# Patient Record
Sex: MALE | Race: WHITE | Employment: OTHER | URBAN - METROPOLITAN AREA
[De-identification: names, ages, dates, MRNs, and addresses within clinical notes are randomized per-mention and may not be internally consistent; named-entity substitution may affect disease eponyms.]

---

## 2019-10-07 ENCOUNTER — HOSPITAL ENCOUNTER (INPATIENT)
Facility: CLINIC | Age: 84
LOS: 2 days | Discharge: HOME | DRG: 062 | End: 2019-10-09
Attending: EMERGENCY MEDICINE | Admitting: INTERNAL MEDICINE
Payer: COMMERCIAL

## 2019-10-07 ENCOUNTER — APPOINTMENT (OUTPATIENT)
Dept: CT IMAGING | Facility: CLINIC | Age: 84
DRG: 062 | End: 2019-10-07
Attending: EMERGENCY MEDICINE
Payer: COMMERCIAL

## 2019-10-07 DIAGNOSIS — I48.91 ATRIAL FIBRILLATION, NEW ONSET (H): ICD-10-CM

## 2019-10-07 DIAGNOSIS — I63.9 CEREBROVASCULAR ACCIDENT (CVA), UNSPECIFIED MECHANISM (H): ICD-10-CM

## 2019-10-07 DIAGNOSIS — R93.89 ABNORMAL VENTRICULAR WALL MOTION: Primary | ICD-10-CM

## 2019-10-07 LAB
ANION GAP SERPL CALCULATED.3IONS-SCNC: 5 MMOL/L (ref 3–14)
APTT PPP: 24 SEC (ref 22–37)
BASOPHILS # BLD AUTO: 0 10E9/L (ref 0–0.2)
BASOPHILS NFR BLD AUTO: 0.2 %
BUN SERPL-MCNC: 30 MG/DL (ref 7–30)
CALCIUM SERPL-MCNC: 9.3 MG/DL (ref 8.5–10.1)
CHLORIDE SERPL-SCNC: 103 MMOL/L (ref 94–109)
CO2 SERPL-SCNC: 31 MMOL/L (ref 20–32)
CREAT SERPL-MCNC: 1.25 MG/DL (ref 0.66–1.25)
DIFFERENTIAL METHOD BLD: ABNORMAL
EOSINOPHIL # BLD AUTO: 0.1 10E9/L (ref 0–0.7)
EOSINOPHIL NFR BLD AUTO: 0.2 %
ERYTHROCYTE [DISTWIDTH] IN BLOOD BY AUTOMATED COUNT: 13.3 % (ref 10–15)
GFR SERPL CREATININE-BSD FRML MDRD: 52 ML/MIN/{1.73_M2}
GLUCOSE BLDC GLUCOMTR-MCNC: 86 MG/DL (ref 70–99)
GLUCOSE SERPL-MCNC: 98 MG/DL (ref 70–99)
HCT VFR BLD AUTO: 49.5 % (ref 40–53)
HGB BLD-MCNC: 16.4 G/DL (ref 13.3–17.7)
IMM GRANULOCYTES # BLD: 0.3 10E9/L (ref 0–0.4)
IMM GRANULOCYTES NFR BLD: 1.2 %
INR PPP: 1.03 (ref 0.86–1.14)
LYMPHOCYTES # BLD AUTO: 1.7 10E9/L (ref 0.8–5.3)
LYMPHOCYTES NFR BLD AUTO: 8.1 %
MCH RBC QN AUTO: 31.5 PG (ref 26.5–33)
MCHC RBC AUTO-ENTMCNC: 33.1 G/DL (ref 31.5–36.5)
MCV RBC AUTO: 95 FL (ref 78–100)
MONOCYTES # BLD AUTO: 1.7 10E9/L (ref 0–1.3)
MONOCYTES NFR BLD AUTO: 8.1 %
NEUTROPHILS # BLD AUTO: 17.6 10E9/L (ref 1.6–8.3)
NEUTROPHILS NFR BLD AUTO: 82.2 %
PLATELET # BLD AUTO: 270 10E9/L (ref 150–450)
POTASSIUM SERPL-SCNC: 4.1 MMOL/L (ref 3.4–5.3)
RBC # BLD AUTO: 5.2 10E12/L (ref 4.4–5.9)
SODIUM SERPL-SCNC: 139 MMOL/L (ref 133–144)
TROPONIN I SERPL-MCNC: 0.05 UG/L (ref 0–0.04)
WBC # BLD AUTO: 21.4 10E9/L (ref 4–11)

## 2019-10-07 PROCEDURE — 99291 CRITICAL CARE FIRST HOUR: CPT | Mod: GC | Performed by: PSYCHIATRY & NEUROLOGY

## 2019-10-07 PROCEDURE — 70498 CT ANGIOGRAPHY NECK: CPT

## 2019-10-07 PROCEDURE — 25000132 ZZH RX MED GY IP 250 OP 250 PS 637: Performed by: INTERNAL MEDICINE

## 2019-10-07 PROCEDURE — 99292 CRITICAL CARE ADDL 30 MIN: CPT

## 2019-10-07 PROCEDURE — 0042T CT HEAD PERFUSION WITH CONTRAST: CPT

## 2019-10-07 PROCEDURE — 80048 BASIC METABOLIC PNL TOTAL CA: CPT | Performed by: EMERGENCY MEDICINE

## 2019-10-07 PROCEDURE — 25000125 ZZHC RX 250: Performed by: EMERGENCY MEDICINE

## 2019-10-07 PROCEDURE — 85025 COMPLETE CBC W/AUTO DIFF WBC: CPT | Performed by: EMERGENCY MEDICINE

## 2019-10-07 PROCEDURE — 3E03317 INTRODUCTION OF OTHER THROMBOLYTIC INTO PERIPHERAL VEIN, PERCUTANEOUS APPROACH: ICD-10-PCS | Performed by: EMERGENCY MEDICINE

## 2019-10-07 PROCEDURE — 99222 1ST HOSP IP/OBS MODERATE 55: CPT | Mod: AI | Performed by: INTERNAL MEDICINE

## 2019-10-07 PROCEDURE — 93005 ELECTROCARDIOGRAM TRACING: CPT

## 2019-10-07 PROCEDURE — 00000146 ZZHCL STATISTIC GLUCOSE BY METER IP

## 2019-10-07 PROCEDURE — 99291 CRITICAL CARE FIRST HOUR: CPT

## 2019-10-07 PROCEDURE — 70450 CT HEAD/BRAIN W/O DYE: CPT

## 2019-10-07 PROCEDURE — 85610 PROTHROMBIN TIME: CPT | Performed by: EMERGENCY MEDICINE

## 2019-10-07 PROCEDURE — 85730 THROMBOPLASTIN TIME PARTIAL: CPT | Performed by: EMERGENCY MEDICINE

## 2019-10-07 PROCEDURE — 96374 THER/PROPH/DIAG INJ IV PUSH: CPT

## 2019-10-07 PROCEDURE — 36415 COLL VENOUS BLD VENIPUNCTURE: CPT | Performed by: INTERNAL MEDICINE

## 2019-10-07 PROCEDURE — 99207 ZZC DOWN CODE DUE TO INITIAL EXAM: CPT | Performed by: INTERNAL MEDICINE

## 2019-10-07 PROCEDURE — 20000003 ZZH R&B ICU

## 2019-10-07 PROCEDURE — 25000128 H RX IP 250 OP 636: Performed by: EMERGENCY MEDICINE

## 2019-10-07 PROCEDURE — 25000128 H RX IP 250 OP 636: Performed by: STUDENT IN AN ORGANIZED HEALTH CARE EDUCATION/TRAINING PROGRAM

## 2019-10-07 PROCEDURE — 84484 ASSAY OF TROPONIN QUANT: CPT | Performed by: INTERNAL MEDICINE

## 2019-10-07 RX ORDER — BISACODYL 10 MG
10 SUPPOSITORY, RECTAL RECTAL DAILY PRN
Status: DISCONTINUED | OUTPATIENT
Start: 2019-10-07 | End: 2019-10-09 | Stop reason: HOSPADM

## 2019-10-07 RX ORDER — LABETALOL HYDROCHLORIDE 5 MG/ML
10 INJECTION, SOLUTION INTRAVENOUS ONCE
Status: DISCONTINUED | OUTPATIENT
Start: 2019-10-07 | End: 2019-10-08

## 2019-10-07 RX ORDER — ONDANSETRON 4 MG/1
4 TABLET, ORALLY DISINTEGRATING ORAL EVERY 6 HOURS PRN
Status: DISCONTINUED | OUTPATIENT
Start: 2019-10-07 | End: 2019-10-09 | Stop reason: HOSPADM

## 2019-10-07 RX ORDER — IOPAMIDOL 755 MG/ML
120 INJECTION, SOLUTION INTRAVASCULAR ONCE
Status: COMPLETED | OUTPATIENT
Start: 2019-10-07 | End: 2019-10-07

## 2019-10-07 RX ORDER — ACETAMINOPHEN 325 MG/1
650 TABLET ORAL EVERY 4 HOURS PRN
Status: DISCONTINUED | OUTPATIENT
Start: 2019-10-07 | End: 2019-10-09 | Stop reason: HOSPADM

## 2019-10-07 RX ORDER — LIDOCAINE 40 MG/G
CREAM TOPICAL
Status: DISCONTINUED | OUTPATIENT
Start: 2019-10-07 | End: 2019-10-09 | Stop reason: HOSPADM

## 2019-10-07 RX ORDER — ONDANSETRON 2 MG/ML
4 INJECTION INTRAMUSCULAR; INTRAVENOUS EVERY 6 HOURS PRN
Status: DISCONTINUED | OUTPATIENT
Start: 2019-10-07 | End: 2019-10-09 | Stop reason: HOSPADM

## 2019-10-07 RX ORDER — ATORVASTATIN CALCIUM 10 MG/1
40 TABLET, FILM COATED ORAL
Status: DISCONTINUED | OUTPATIENT
Start: 2019-10-07 | End: 2019-10-08

## 2019-10-07 RX ORDER — ACETAMINOPHEN 650 MG/1
650 SUPPOSITORY RECTAL EVERY 4 HOURS PRN
Status: DISCONTINUED | OUTPATIENT
Start: 2019-10-07 | End: 2019-10-09 | Stop reason: HOSPADM

## 2019-10-07 RX ORDER — NALOXONE HYDROCHLORIDE 0.4 MG/ML
.1-.4 INJECTION, SOLUTION INTRAMUSCULAR; INTRAVENOUS; SUBCUTANEOUS
Status: DISCONTINUED | OUTPATIENT
Start: 2019-10-07 | End: 2019-10-09 | Stop reason: HOSPADM

## 2019-10-07 RX ORDER — AMOXICILLIN 250 MG
1 CAPSULE ORAL 2 TIMES DAILY PRN
Status: DISCONTINUED | OUTPATIENT
Start: 2019-10-07 | End: 2019-10-09 | Stop reason: HOSPADM

## 2019-10-07 RX ORDER — POLYETHYLENE GLYCOL 3350 17 G/17G
17 POWDER, FOR SOLUTION ORAL DAILY PRN
Status: DISCONTINUED | OUTPATIENT
Start: 2019-10-07 | End: 2019-10-09 | Stop reason: HOSPADM

## 2019-10-07 RX ORDER — PROCHLORPERAZINE 25 MG
12.5 SUPPOSITORY, RECTAL RECTAL EVERY 12 HOURS PRN
Status: DISCONTINUED | OUTPATIENT
Start: 2019-10-07 | End: 2019-10-09 | Stop reason: HOSPADM

## 2019-10-07 RX ORDER — AMOXICILLIN 250 MG
2 CAPSULE ORAL 2 TIMES DAILY PRN
Status: DISCONTINUED | OUTPATIENT
Start: 2019-10-07 | End: 2019-10-09 | Stop reason: HOSPADM

## 2019-10-07 RX ORDER — LABETALOL HYDROCHLORIDE 5 MG/ML
10-20 INJECTION, SOLUTION INTRAVENOUS EVERY 10 MIN PRN
Status: DISCONTINUED | OUTPATIENT
Start: 2019-10-07 | End: 2019-10-09 | Stop reason: HOSPADM

## 2019-10-07 RX ORDER — PROCHLORPERAZINE MALEATE 5 MG
5 TABLET ORAL EVERY 6 HOURS PRN
Status: DISCONTINUED | OUTPATIENT
Start: 2019-10-07 | End: 2019-10-09 | Stop reason: HOSPADM

## 2019-10-07 RX ADMIN — ALTEPLASE 6.73 MG: KIT at 17:18

## 2019-10-07 RX ADMIN — ATORVASTATIN CALCIUM 40 MG: 10 TABLET, FILM COATED ORAL at 19:44

## 2019-10-07 RX ADMIN — SODIUM CHLORIDE 100 ML: 9 INJECTION, SOLUTION INTRAVENOUS at 16:54

## 2019-10-07 RX ADMIN — SODIUM CHLORIDE 100 ML: 9 INJECTION, SOLUTION INTRAVENOUS at 18:30

## 2019-10-07 RX ADMIN — ALTEPLASE 60.59 MG: KIT at 17:20

## 2019-10-07 RX ADMIN — IOPAMIDOL 120 ML: 755 INJECTION, SOLUTION INTRAVENOUS at 16:54

## 2019-10-07 ASSESSMENT — ENCOUNTER SYMPTOMS
SHORTNESS OF BREATH: 0
FATIGUE: 1
WEAKNESS: 1
FACIAL ASYMMETRY: 0
NUMBNESS: 1
PALPITATIONS: 0
SPEECH DIFFICULTY: 1
SEIZURES: 0
LIGHT-HEADEDNESS: 1

## 2019-10-07 ASSESSMENT — ACTIVITIES OF DAILY LIVING (ADL): ADLS_ACUITY_SCORE: 13

## 2019-10-07 ASSESSMENT — MIFFLIN-ST. JEOR: SCORE: 1428.82

## 2019-10-07 NOTE — ED PROVIDER NOTES
"  History     Chief Complaint:  One-sided Weakness    The history is provided by the spouse, the patient and the EMS personnel.      Tu Barraza is a 84 year old male who presents via EMS and his wife for evaluation of right-sided weakness. Earlier today, the patient and his wife went to the gym to work out. While he was working out, he noticed he felt more fatigued than usual. At the end of his workout, he went to go sit in the hot tub and developed the sudden onset of right arm numbness with some slight associated right leg numbness. He was not able to lift his hand up to itch his face. After a while, his symptoms improved but never fully went away. Before they went home, he was feeling \"woozy.\" When the patient and his wife got home, around 1400, the patient felt better than before. His wife gave him a baby aspirin around this time. Around 1545, he laid down on the couch, lost control of his bladder, and redeveloped the same right sided numbness, tingly sensation, and weakness as before. He never lost consciousness or had seizure like activity, but states he felt drowsy and did not feel like he was fully there. EMS was called and transferred here for further evaluation. He was found to have a blood sugar of 110, blood pressure of 124/72, and a heart rate in the 60's.    Here, the patient still is experiencing some tingling his arm and leg, but is not as severe. His wife also thinks his speech is not as clear as it usually is. Patient denies any leg pain or swelling. He denies any chest pain, shortness of breath, or heart palpations. His wife denies him ever having a facial droop.    Allergies:  Penicillins      Medications:    Prednisone   ZPak    Past Medical History:    The patient denies any significant past medical history.    Past Surgical History:    Colonoscopy     Family History:    Colorectal cancer    Social History:  Negative for tobacco use.  Positive for alcohol use.  Negative for drug " "use.  Presents via EMS and his wife.  Marital Status:       Review of Systems   Constitutional: Positive for fatigue.   Respiratory: Negative for shortness of breath.    Cardiovascular: Negative for chest pain, palpitations and leg swelling.   Neurological: Positive for speech difficulty, weakness, light-headedness and numbness. Negative for seizures, syncope and facial asymmetry.   All other systems reviewed and are negative.      Physical Exam     Patient Vitals for the past 24 hrs:   BP Temp Temp src Pulse Heart Rate Resp SpO2 Height Weight   10/07/19 1800 (!) 156/89 -- -- 62 70 18 98 % -- --   10/07/19 1750 (!) 164/89 -- -- -- 61 20 99 % -- --   10/07/19 1730 (!) 191/104 -- -- 70 79 18 -- -- --   10/07/19 1720 (!) 169/94 -- -- 63 72 19 -- -- --   10/07/19 1715 (!) 166/107 -- -- 80 69 13 98 % -- --   10/07/19 1710 (!) 183/94 -- -- 69 77 16 95 % -- --   10/07/19 1645 (!) 170/89 -- -- -- 73 27 98 % -- --   10/07/19 1641 (!) 188/102 98.7  F (37.1  C) Temporal 70 70 16 98 % 1.753 m (5' 9\") 74.8 kg (165 lb)     Physical Exam  General/Appearance: appears stated age, well-groomed, appears comfortable  Eyes: EOMI, no scleral injection, no icterus  ENT: MMM  Neck: supple, nl ROM, no stiffness  Cardiovascular: irregularly irregular, nl S1S2, no m/r/g, 2+ pulses in all 4 extremities, cap refill <2sec  Respiratory: CTAB, good air movement throughout, no wheezes/rhonchi/rales, no increased WOB, no retractions  Back: no lesions  GI: abd soft, non-distended, nttp,  no HSM, no rebound, no guarding, nl BS  MSK: ABREU, good tone, no bony abnormality  Skin: warm and well-perfused, no rash, no edema, no ecchymosis, nl turgor  Neuro: GCS 15, alert and oriented, mild drift (score 1) in RUE and RLE but doesn't hit bed, otherwise neurologically intact (though wife reports a sense of mild slurred speech), NIHSS 2  Psych: interacts appropriately  Heme: no petechia, no purpura, no active bleeding    Emergency Department Course "   ECG:  Indication: One-sided weakness  Time: 1646  Vent. Rate 73 bpm. KY interval *. QRS duration 78. QT/QTc 372/409. P-R-T axis * 66 64. Atrial fibrillation. Low voltage QRS. Read time: 1700    Imaging:  CTA Head Neck with contrast:   1. Moderate atherosclerotic disease with calcified and noncalcified plaque in the proximal left internal carotid artery resulting in 70% stenosis. This is best seen on the 3-D reformatted isolated left siphon internal carotid artery images.  2. 50% stenosis at the origin left vertebral artery.  3. No evidence for intracranial thromboembolism or aneurysm. As per radiology.    CT Head without contrast:   Chronic changes. No evidence for intracranial hemorrhage or any acute process. As per radiology.    CT Head Perfusion with contrast:   Questionable altered perfusion in left frontal lobe on perfusion imaging. If indicated, MRI might be helpful in further evaluation. As per radiology.     Laboratory:  CBC: WBC: 21.4 (H), HGB: 16.4, PLT: 270  BMP: GFR Estimate: 52 (L), o/w WNL (Creatinine: 1.25)    INR: 1.03   PTT: 24    Procedures:  None.     Interventions:  1718 Activase 6.73 mg IV  1720 Activase 60.59 mg IV    Emergency Department Course:  Nursing notes and vitals reviewed. 1636 I performed an exam of the patient as documented above.     1645 Code stroke was called.    IV inserted. Medicine administered as documented above. Blood drawn. This was sent to the lab for further testing, results above.    The patient was sent for a head and neck CT while in the emergency department, findings above.     EKG obtained in the ED, see results above.     1650 I consulted with stroke/neurology, regarding the patient's history and presentation here in the emergency department.    1715 I rechecked the patient and discussed the results of his workup thus far.     1745  I consulted with Dr. Brown of the hospitalist services. They are in agreement to accept the patient for admission.    Findings and  plan explained to the Patient, spouse, and son who consents to admission. Discussed the patient with Dr. Brown, who will admit the patient to an ICU bed for further monitoring, evaluation, and treatment.    Impression & Plan    CMS Diagnoses: The patient has stroke symptoms:           ED Stroke specific documentation           NIHSS PDF          Protocol PDF     Patient last known well time: 1330  ED Provider first to bedside at: 1632  CT Results received at: 1710    tPA:   Administered. Risks and benefits of tPA were discussed with Patient and Family prior to administration.    If treating with tPA: Ensure SBP<185 and DBP<105 prior to treatment with IV tPA.  Administering IV tPA after treatment with IV labetalol, hydralazine, or nicardipine is reasonable once BP control is established.    Endovascular Retrieval:  Not initiated due to absence of proximal vessel occlusion    National Institutes of Health Stroke Scale (Baseline)  Time Performed: 1635      Score    Level of consciousness: (0)   Alert, keenly responsive    LOC questions: (0)   Answers both questions correctly    LOC commands: (0)   Performs both tasks correctly    Best gaze: (0)   Normal    Visual: (0)   No visual loss    Facial palsy: (0)   Normal symmetrical movements    Motor arm (left): (0)   No drift    Motor arm (right): (1)   Drift    Motor leg (left): (0)   No drift    Motor leg (right): (1)   Drift    Limb ataxia: (0)   Absent    Sensory: (0)   Normal- no sensory loss    Best language: (0)   Normal- no aphasia    Dysarthria: (0)   Normal    Extinction and inattention: (0)   No abnormality        Total Score:  2        Stroke Mimics were considered (including migraine headache, seizure disorder, hypoglycemia (or hyperglycemia), head or spinal trauma, CNS infection, Toxin ingestion and shock state (e.g. sepsis) .      Medical Decision Making:  This patient is a healthy 84-year-old male who presents with approximately 3 to 3-1/2 hours of  right-sided upper and lower extremity numbness, tingling, slight weakness.  There has been some waxing and waning component to it however it never fully went away since it began around 1:30 PM.  Here he is got slight drift in both extremities though neither of them hit the bed.  I am not able to appreciate slurred speech but his wife feels his speech is slightly slurred.  NIHSS is 2.  Of note he is very healthy but today does have newly noted A. fib, which obviously is a risk factor.  He received 1 baby aspirin from his wife earlier today.  He was made a code stroke upon his arrival.  Neurology team got involved and ultimately, in combination with me and family decided to give TPA.  Family and patient were made fully aware of risks associated with TPA.  He will be taken to the ICU for continued monitoring.  Right now his blood pressure has been staying below 185/100 but were he to go above this he would need labetalol as needed.    Critical Care time:  was 40 minutes for this patient excluding procedures.    Diagnosis:    ICD-10-CM    1. Cerebrovascular accident (CVA), unspecified mechanism (H) I63.9    2. Atrial fibrillation, new onset (H) I48.91        Disposition:  Admitted to Dr. Brown     Scribe Disclosure:  I, Ashlee Whitfield, am serving as a scribe on 10/7/2019 at 4:47 PM to personally document services performed by Jody Mcgrath* based on my observations and the provider's statements to me.     Ashlee Whitfeild  10/7/2019    EMERGENCY DEPARTMENT       Jody Mcgrath MD  10/07/19 2815

## 2019-10-07 NOTE — ED NOTES
Bed: ST01  Expected date:   Expected time:   Means of arrival:   Comments:  Scout  84 M R sided numbness/weakness since 1230

## 2019-10-07 NOTE — CONSULTS
[facility]    Stroke Consult Note    Reason for Consult: Right sided weakness, numbness and dysarthria    Chief Complaint: One-sided Weakness      HPI  Tu Barraza is a 84 year old male with no significant past medical history was brought by the EMS for evaluation of right sided numbness and weakness.     History was obtained from pt and wife. Stated that he woke up fine this morning. Went to workout, was feeling tired so sat in hot tub. Felt right sided numb and weak, and wife noted some slurring of speech. LKW was 1.30 pm. He got out of tub and went home. Symptoms improved over time.  Around 3 pm, again started feeling weak and numb on right side extremities, UE>LE. Called EMS.     Upon arrival to ER, his NIHSS 4 for right arm drift and numbness and mild dysarthria and facial droop. Was noted to have new onset AFIB in the ER. CT head, CT angio and perfusion were unremarkable.   He was within the window for acute stroke therapy, tPA. Benefits and risks discussed- pt and family agreed with the tPA.    No pevious stroke, no other med problems.  Meds: Prednisone in last week. Gave baby aspirin - not normally on it - took for a while     Social: Denies smoking, heavy alcohol or illicit drug use.   Out of country - [place], [place], some other place, got home two weeks ago       TPA Treatment   Administered. Risks and benefits of tPA were discussed with Patient and Family prior to administration.    Endovascular Treatment  Not initiated due to absence of proximal vessel occlusion    Impression  Ischemic Stroke due to cardioembolism    Recommendations  Acute Ischemic Stroke (post tPA) Recommendations  - Close monitoring and neurochecks per post-tPA orders for any evidence of hemorrhagic transformation or allergic reaction  - Labetalol PRN to maintain BP < 180/105  - Hold all anti-thrombotic and anti-coagulant medications for 24 hrs post-IV Alteplase (tPA)  - Hold pharmacologic VTE prophylaxis for 24  hrs post-IV Alteplase (tPA)  - Statin:  Lipitor 40 mg daily  - Repeat HCT 24 hrs post-tPA  - MRI Stroke Protocol  - Telemetry, EKG  - Bedside Glucose Monitoring  - A1c, Lipid Panel, Troponin x 3  - PT/OT/SLP  - PM&R  - Stroke Education  - Depression Screen  - Apnea Screen  - Euthermia, Euglycemia    Patient Follow-up    - final recommendation pending work-up    Thank you for this consult. We will continue to follow.     Sameer Salgado MD  Fellow, Vascular Neurology  Pager:  [phone]    ______________________________________________________    Past Medical History   History reviewed. No pertinent past medical history.  Past Surgical History   Past Surgical History:   Procedure Laterality Date     COLONOSCOPY  4/30/2014    Procedure: COMBINED COLONOSCOPY, SINGLE BIOPSY/POLYPECTOMY BY BIOPSY;  Surgeon: Eros Licona MD;  Location:  GI     Medications   Home Meds  Prior to Admission medications    Not on File       Scheduled Meds    alteplase (ACTIVASE) drip - ADULT (Ischemic Stroke)  0.81 mg/kg Intravenous Once    Followed by     sodium chloride 0.9%  100 mL Intravenous Once     labetalol  10 mg Intravenous Once       Infusion Meds      PRN Meds      Allergies   Allergies   Allergen Reactions     Penicillins      Family History   Family History   Problem Relation Age of Onset     Cancer - colorectal Father      Social History   Social History     Tobacco Use     Smoking status: Never Smoker     Smokeless tobacco: Never Used   Substance Use Topics     Alcohol use: Yes     Comment: 3-4 times a week     Drug use: No       Review of Systems   The 10 point Review of Systems is negative other than noted in the HPI or here.       PHYSICAL EXAMINATION  Temp:  [98.7  F (37.1  C)] 98.7  F (37.1  C)  Pulse:  [63-80] 70  Heart Rate:  [69-79] 79  Resp:  [13-27] 18  BP: (166-191)/() 191/104  SpO2:  [95 %-98 %] 98 %     Neurologic  Mental Status:  alert, oriented x 3, follows commands, naming and repetition normal,  mild slurring  Cranial Nerves:  visual fields intact, PERRL, EOMI with normal smooth pursuit, facial sensation intact and symmetric, hearing not formally tested but intact to conversation, palate elevation symmetric and uvula midline, no dysarthria, shoulder shrug strong bilaterally, tongue protrusion midline, mild facial asymmetry  Motor:  normal muscle tone and bulk, no abnormal movements, right arm drift  Reflexes:  toes down-going  Sensory:  decreased sensations in right arm  Coordination:  normal finger-to-nose and heel-to-shin bilaterally without dysmetria, rapid alternating movements symmetric  Station/Gait:  deferred      Dysphagia Screen  Per Nursing    Stroke Scales    NIHSS  Interval baseline (10/07/19 1732)   Interval Comments     1a. Level of Consciousness 0-->Alert, keenly responsive   1b. LOC Questions 0-->Answers both questions correctly   1c. LOC Commands 0-->Performs both tasks correctly   2.   Best Gaze 0-->Normal   3.   Visual 0-->No visual loss   4.   Facial Palsy 1-->Minor paralysis (flattened nasolabial fold, asymmetry on smiling)   5a. Motor Arm, Left 0-->No drift, limb holds 90 (or 45) degrees for full 10 secs   5b. Motor Arm, Right 1-->Drift, limb holds 90 (or 45) degrees, but drifts down before full 10 secs, does not hit bed or other support   6a. Motor Leg, Left 0-->No drift, leg holds 30 degree position for full 5 secs   6b. Motor Leg, right 0-->No drift, leg holds 30 degree position for full 5 secs   7.   Limb Ataxia 0-->Absent   8.   Sensory 1-->Mild-to-moderate sensory loss, patient feels pinprick is less sharp or is dull on the affected side, or there is a loss of superficial pain with pinprick, but patient is aware of being touched   9.   Best Language 0-->No aphasia, normal   10. Dysarthria 1-->Mild-to-moderate dysarthria, patient slurs at least some words and, at worst, can be understood with some difficulty   11. Extinction and Inattention  0-->No abnormality   Total 4 (10/07/19  1732)       Imaging  I personally reviewed all imaging; relevant findings per HPI.     Lab Results Data   CBC  Recent Labs   Lab 10/07/19  1644   WBC 21.4*   RBC 5.20   HGB 16.4   HCT 49.5        Basic Metabolic Panel    Recent Labs   Lab 10/07/19  1644      POTASSIUM 4.1   CHLORIDE 103   CO2 31   BUN 30   CR 1.25   GLC 98   CRUZ 9.3     Liver Panel  No lab results found.  INR  Recent Labs   Lab Test 10/07/19  1644   INR 1.03      Lipid ProfileNo lab results found.  A1C  Recent Labs   Lab Test 12/12/11  0820   A1C 5.3     Troponin Sebas results for input(s): TROPI in the last 168 hours.       Stroke Code / Stroke Consult Data Data   Stroke Code Data  (for stroke code without tele)  Stroke code activated 10/07/19   1646   First stroke provider response 10/07/19   1649   Last known normal 10/07/19   1330   Time of discovery   (or onset of symptoms) 10/07/19   1330   Head CT read by me 10/07/19   1702   Was stroke code de-escalated? No

## 2019-10-07 NOTE — ED TRIAGE NOTES
Exercising then sat in hot tub and developed some right arm weakness and numbness. Last NL 1430 today. Had episode of incontinence as well.

## 2019-10-08 ENCOUNTER — APPOINTMENT (OUTPATIENT)
Dept: SPEECH THERAPY | Facility: CLINIC | Age: 84
DRG: 062 | End: 2019-10-08
Attending: INTERNAL MEDICINE
Payer: COMMERCIAL

## 2019-10-08 ENCOUNTER — APPOINTMENT (OUTPATIENT)
Dept: MRI IMAGING | Facility: CLINIC | Age: 84
DRG: 062 | End: 2019-10-08
Attending: INTERNAL MEDICINE
Payer: COMMERCIAL

## 2019-10-08 ENCOUNTER — APPOINTMENT (OUTPATIENT)
Dept: CARDIOLOGY | Facility: CLINIC | Age: 84
DRG: 062 | End: 2019-10-08
Attending: INTERNAL MEDICINE
Payer: COMMERCIAL

## 2019-10-08 ENCOUNTER — APPOINTMENT (OUTPATIENT)
Dept: OCCUPATIONAL THERAPY | Facility: CLINIC | Age: 84
DRG: 062 | End: 2019-10-08
Payer: COMMERCIAL

## 2019-10-08 PROBLEM — R73.03 PREDIABETES: Status: ACTIVE | Noted: 2019-10-08

## 2019-10-08 PROBLEM — I48.91 ATRIAL FIBRILLATION, NEW ONSET (H): Status: ACTIVE | Noted: 2019-10-08

## 2019-10-08 LAB
ALBUMIN SERPL-MCNC: 3 G/DL (ref 3.4–5)
ALP SERPL-CCNC: 63 U/L (ref 40–150)
ALT SERPL W P-5'-P-CCNC: 26 U/L (ref 0–70)
ANION GAP SERPL CALCULATED.3IONS-SCNC: 5 MMOL/L (ref 3–14)
AST SERPL W P-5'-P-CCNC: 23 U/L (ref 0–45)
BILIRUB DIRECT SERPL-MCNC: 0.2 MG/DL (ref 0–0.2)
BILIRUB SERPL-MCNC: 1 MG/DL (ref 0.2–1.3)
BUN SERPL-MCNC: 22 MG/DL (ref 7–30)
CALCIUM SERPL-MCNC: 8.6 MG/DL (ref 8.5–10.1)
CHLORIDE SERPL-SCNC: 106 MMOL/L (ref 94–109)
CHOLEST SERPL-MCNC: 162 MG/DL
CK SERPL-CCNC: 47 U/L (ref 30–300)
CO2 SERPL-SCNC: 28 MMOL/L (ref 20–32)
CREAT SERPL-MCNC: 1.12 MG/DL (ref 0.66–1.25)
ERYTHROCYTE [DISTWIDTH] IN BLOOD BY AUTOMATED COUNT: 13.3 % (ref 10–15)
GFR SERPL CREATININE-BSD FRML MDRD: 60 ML/MIN/{1.73_M2}
GLUCOSE SERPL-MCNC: 90 MG/DL (ref 70–99)
HBA1C MFR BLD: 5.7 % (ref 0–5.6)
HCT VFR BLD AUTO: 45 % (ref 40–53)
HDLC SERPL-MCNC: 54 MG/DL
HGB BLD-MCNC: 15 G/DL (ref 13.3–17.7)
LDLC SERPL CALC-MCNC: 94 MG/DL
MCH RBC QN AUTO: 32 PG (ref 26.5–33)
MCHC RBC AUTO-ENTMCNC: 33.3 G/DL (ref 31.5–36.5)
MCV RBC AUTO: 96 FL (ref 78–100)
NONHDLC SERPL-MCNC: 108 MG/DL
PLATELET # BLD AUTO: 232 10E9/L (ref 150–450)
POTASSIUM SERPL-SCNC: 3.8 MMOL/L (ref 3.4–5.3)
PROT SERPL-MCNC: 5.8 G/DL (ref 6.8–8.8)
RBC # BLD AUTO: 4.69 10E12/L (ref 4.4–5.9)
SODIUM SERPL-SCNC: 139 MMOL/L (ref 133–144)
TRIGL SERPL-MCNC: 70 MG/DL
TROPONIN I SERPL-MCNC: 0.05 UG/L (ref 0–0.04)
TSH SERPL DL<=0.005 MIU/L-ACNC: 1.66 MU/L (ref 0.4–4)
WBC # BLD AUTO: 11.9 10E9/L (ref 4–11)

## 2019-10-08 PROCEDURE — 84443 ASSAY THYROID STIM HORMONE: CPT | Performed by: INTERNAL MEDICINE

## 2019-10-08 PROCEDURE — 82550 ASSAY OF CK (CPK): CPT | Performed by: INTERNAL MEDICINE

## 2019-10-08 PROCEDURE — 40000264 ECHOCARDIOGRAM COMPLETE

## 2019-10-08 PROCEDURE — 99232 SBSQ HOSP IP/OBS MODERATE 35: CPT | Mod: GC | Performed by: PSYCHIATRY & NEUROLOGY

## 2019-10-08 PROCEDURE — 97530 THERAPEUTIC ACTIVITIES: CPT | Mod: GO

## 2019-10-08 PROCEDURE — 80048 BASIC METABOLIC PNL TOTAL CA: CPT | Performed by: INTERNAL MEDICINE

## 2019-10-08 PROCEDURE — 92610 EVALUATE SWALLOWING FUNCTION: CPT | Mod: GN | Performed by: SPEECH-LANGUAGE PATHOLOGIST

## 2019-10-08 PROCEDURE — 84484 ASSAY OF TROPONIN QUANT: CPT | Performed by: INTERNAL MEDICINE

## 2019-10-08 PROCEDURE — 93306 TTE W/DOPPLER COMPLETE: CPT | Mod: 26 | Performed by: INTERNAL MEDICINE

## 2019-10-08 PROCEDURE — 97165 OT EVAL LOW COMPLEX 30 MIN: CPT | Mod: GO

## 2019-10-08 PROCEDURE — 97535 SELF CARE MNGMENT TRAINING: CPT | Mod: GO

## 2019-10-08 PROCEDURE — 85027 COMPLETE CBC AUTOMATED: CPT | Performed by: INTERNAL MEDICINE

## 2019-10-08 PROCEDURE — 25000132 ZZH RX MED GY IP 250 OP 250 PS 637: Performed by: INTERNAL MEDICINE

## 2019-10-08 PROCEDURE — 93005 ELECTROCARDIOGRAM TRACING: CPT

## 2019-10-08 PROCEDURE — 80076 HEPATIC FUNCTION PANEL: CPT | Performed by: INTERNAL MEDICINE

## 2019-10-08 PROCEDURE — 25500064 ZZH RX 255 OP 636: Performed by: INTERNAL MEDICINE

## 2019-10-08 PROCEDURE — A9585 GADOBUTROL INJECTION: HCPCS | Performed by: INTERNAL MEDICINE

## 2019-10-08 PROCEDURE — 99233 SBSQ HOSP IP/OBS HIGH 50: CPT | Performed by: INTERNAL MEDICINE

## 2019-10-08 PROCEDURE — 80061 LIPID PANEL: CPT | Performed by: INTERNAL MEDICINE

## 2019-10-08 PROCEDURE — 36415 COLL VENOUS BLD VENIPUNCTURE: CPT | Performed by: INTERNAL MEDICINE

## 2019-10-08 PROCEDURE — 12000000 ZZH R&B MED SURG/OB

## 2019-10-08 PROCEDURE — 93010 ELECTROCARDIOGRAM REPORT: CPT | Performed by: INTERNAL MEDICINE

## 2019-10-08 PROCEDURE — 92526 ORAL FUNCTION THERAPY: CPT | Mod: GN | Performed by: SPEECH-LANGUAGE PATHOLOGIST

## 2019-10-08 PROCEDURE — 83036 HEMOGLOBIN GLYCOSYLATED A1C: CPT | Performed by: INTERNAL MEDICINE

## 2019-10-08 PROCEDURE — 70553 MRI BRAIN STEM W/O & W/DYE: CPT

## 2019-10-08 RX ORDER — ATORVASTATIN CALCIUM 20 MG/1
20 TABLET, FILM COATED ORAL DAILY
Qty: 30 TABLET | Refills: 0 | Status: SHIPPED | OUTPATIENT
Start: 2019-10-08 | End: 2019-10-09

## 2019-10-08 RX ORDER — ATORVASTATIN CALCIUM 20 MG/1
20 TABLET, FILM COATED ORAL
Status: DISCONTINUED | OUTPATIENT
Start: 2019-10-08 | End: 2019-10-09 | Stop reason: HOSPADM

## 2019-10-08 RX ORDER — GADOBUTROL 604.72 MG/ML
7 INJECTION INTRAVENOUS ONCE
Status: COMPLETED | OUTPATIENT
Start: 2019-10-08 | End: 2019-10-08

## 2019-10-08 RX ADMIN — APIXABAN 5 MG: 5 TABLET, FILM COATED ORAL at 20:35

## 2019-10-08 RX ADMIN — GADOBUTROL 7 ML: 604.72 INJECTION INTRAVENOUS at 08:13

## 2019-10-08 RX ADMIN — ATORVASTATIN CALCIUM 20 MG: 20 TABLET, FILM COATED ORAL at 20:35

## 2019-10-08 ASSESSMENT — ACTIVITIES OF DAILY LIVING (ADL)
COGNITION: 0 - NO COGNITION ISSUES REPORTED
ADLS_ACUITY_SCORE: 12
ADLS_ACUITY_SCORE: 10
TRANSFERRING: 0-->INDEPENDENT
ADLS_ACUITY_SCORE: 8
BATHING: 0-->INDEPENDENT
ADLS_ACUITY_SCORE: 8
FALL_HISTORY_WITHIN_LAST_SIX_MONTHS: NO
DRESS: 0-->INDEPENDENT
RETIRED_COMMUNICATION: 0-->UNDERSTANDS/COMMUNICATES WITHOUT DIFFICULTY
ADLS_ACUITY_SCORE: 8
TOILETING: 0-->INDEPENDENT
RETIRED_EATING: 0-->INDEPENDENT
SWALLOWING: 0-->SWALLOWS FOODS/LIQUIDS WITHOUT DIFFICULTY
ADLS_ACUITY_SCORE: 8
AMBULATION: 0-->INDEPENDENT

## 2019-10-08 ASSESSMENT — MIFFLIN-ST. JEOR: SCORE: 1423.38

## 2019-10-08 NOTE — CONSULTS
Medication coverage check for Eliquis. $96 monthly. (Price applies to [facility] only.)    Moon Leroy CphT  [facility] Liaison  Liaison Cell: [phone]

## 2019-10-08 NOTE — PLAN OF CARE
Discharge Planner PT   Patient plan for discharge: Defer to OT.  Current status: Evaluation orders received. Dicussed pt with OT, who states the pt is back to baseline with functional mobility. Up with independence to supervision secondary to receiving tPA. No skilled PT needs identified at this time.  Barriers to return to prior living situation: Defer to OT  Recommendations for discharge: Defer to OT  Rationale for recommendations: PT order completed, no skilled PT needs.       Entered by: Roxie Cooper 10/08/2019 2:59 PM

## 2019-10-08 NOTE — PROGRESS NOTES
10/08/19 1154   General Information   Onset Date 10/07/19   Start of Care Date 10/08/19   Referring Physician Dr. Ale Brown   Patient Profile Review/OT: Additional Occupational Profile Info See Profile for full history and prior level of function   Patient/Family Goals Statement Agreed to POC goal.  No other goal stated.   Swallowing Evaluation Bedside swallow evaluation   Behaviorial Observations Alert   Mode of current nutrition NPO   Respiratory Status Room air   Comments Admit with: Suspected acute ischemic stroke, status post tPA.     Spouse notes pt with slight increased slurred speech today.  ? paraphasic error x 1 in converation.  Throat clear x 1 in conversation after trials.   Clinical Swallow Evaluation   Dentition present and adequate   Oral Labial Strength and Mobility impaired retraction  (decreased ROM on right)   Lingual Strength and Mobility impaired protrusion;impaired coordination  (slightly to left, oral apraxia)   Velar Elevation impaired  (mild decreased elevation right)   Laryngeal Function Cough;Throat clear;Swallow;Voicing initiated;Dry swallow palpated   Clinical Swallow Eval: Thin Liquid Texture Trial   Mode of Presentation, Thin Liquids cup;straw   Volume of Liquid or Food Presented sips by cup x 12, sips by straw x 2   Oral Phase of Swallow WFL   Pharyngeal Phase of Swallow intact   Diagnostic Statement no overt signs of aspiration after swallows   Clinical Swallow Eval: Solid Food Texture Trial   Mode of Presentation, Solid self-fed   Volume of Solid Food Presented bites x 5   Oral Phase, Solid   (mild decreased mastication on right, mild residue)   Pharyngeal Phase, Solid intact   Diagnostic Statement no overt signs of aspiration after swallows; alternated textures with min cues   Swallow Compensations   Swallow Compensations Reduce amounts;Pacing;Alternate viscosity of consistencies   Esophageal Phase of Swallow   Patient reports or presents with symptoms of esophageal  dysphagia No   Swallow Eval: Clinical Impressions   Skilled Criteria for Therapy Intervention Skilled criteria met.  Treatment indicated.   Functional Assessment Scale (FAS) 5   Treatment Diagnosis mild oral-pharyngeal dysphagia   Diet texture recommendations Regular diet;Thin liquids   Recommended Feeding/Eating Techniques   (see below)   Therapy Frequency 5x/week   Predicted Duration of Therapy Intervention (days/wks) 1 week   Anticipated Discharge Disposition home w/ outpatient services   Risks and Benefits of Treatment have been explained. Yes   Patient, family and/or staff in agreement with Plan of Care Yes   Clinical Impression Comments A bedside swallow evaluation was completed this am per RN request given right facial droop.  Pt demonstrated mild oral-pharyngeal dysphagia at bedside.  Family reports pt with slight increased slurred speech today.  Deficits at bedside include oral motor deficits (greater on right), mild oral apraxia, and mild decreased mastication on right.  Pt used strategies with min cues to clear oral cavity.  No overt signs of aspiration were noted after swallows.   Total Evaluation Time   Total Evaluation Time (Minutes) 10

## 2019-10-08 NOTE — CONSULTS
Received consult to see patient --> Provider Order:  New diagnosis of pre-diabetes    Noted patient having bedside procedure when visit attempted.    Hemoglobin A1C 5.7  Will plan to check education needs once patient transferred out of ICU    Angle Guadarrama RD, LD, CNSC   Clinical Dietitian - [facility]

## 2019-10-08 NOTE — PLAN OF CARE
OT: attempted to see patient for evaluation per nursing request/page however upon arrival SLP with patient and had just initiated their assessment.

## 2019-10-08 NOTE — PLAN OF CARE
Pt neurologically intact throughout the night, numbness of RUE is improving. VSS, pt has no symptoms with bradycardia and afib. He dines any pain or discomfort.

## 2019-10-08 NOTE — PROGRESS NOTES
Ok to discharge home after 1700 today:     Recommendations:   Outpatient speech eval and treat    Elloquis first dose tonight    lipitor 20mg once every day x30mo and then ok to stop     Follow up 6 weeks neuro       Per Dr. Salgado

## 2019-10-08 NOTE — PLAN OF CARE
Discharge Planner SLP   Patient plan for discharge: Did not state  Current status: A bedside swallow evaluation was completed this am per RN request given right facial droop.  Pt demonstrated mild oral-pharyngeal dysphagia at bedside.  Family reports pt with slight increased slurred speech today.  Deficits at bedside include oral motor deficits (greater on right), mild oral apraxia, and mild decreased mastication on right.  Pt used strategies with min cues to clear oral cavity.  No overt signs of aspiration were noted after swallows.  Recommend a regular diet and thin liquids with reminders to use safe swallow strategies: sit up at 90 degrees, small bites/sips, chew carefully, alternate textures, check right side for residue.  Hold po if aspiration signs are noted.  Plan to continue swallow Tx to assess diet tolerance and train strategies as indicated.  Will evaluate speech-language function as IP vs defer to OP pending discharge plan.  Barriers to return to prior living situation: No SLP barriers  Recommendations for discharge: Home with OP SLP  Rationale for recommendations: Full needs to be determined per OT/PT evaluations; Recommend OP SLP services to assess/improve speech-language function for daily needs as indicated.  Anticipate swallow Tx goal to be met prior to discharge       Entered by: Narcisa Caballero 10/08/2019 11:49 AM

## 2019-10-08 NOTE — PLAN OF CARE
Discharge Planner OT   Patient plan for discharge: home  Current status: OT eval and treatment completed on 85yo male admitted with likely ischemic stroke, now s/p tPA. Pt up ambulating with nursing (no adaptive device and indep) prior to initiation of OT. Spouse present. Patient lives in [facility] with spouse, they are very active working out at least 4 days/week at [employer] doing weight training, core strengthening, cardio. They travel on regular basis. Pt is indep all IADLs. Patient performed bed mob, chair and toilet transfers, retrieved items from floor to overhead level and performed turns indep no LOB. His B U/LE strength 5/5, coordination intact. Visual-perception intact. Continues to report some numbness in right arm although improving, resolved in left leg. Demonstrated indep with basic self-care skills. Completed cognitive assessment, SLUMS, and scored 29/30 which is in normal range. VSS with activities. Spouse reports she notices some mildly slurred speech but not apparent to writer (SLP has recommended OP follow-up). No further skilled OT nor PT intervention needed as patient at baseline level of performance with functional mobility and ADL/IADL. Therapies to sign off and complete orders.   Barriers to return to prior living situation: none  Recommendations for discharge: home  Rationale for recommendations: pt at baseline level of function       Entered by: Fercho Thomas 10/08/2019 2:22 PM

## 2019-10-08 NOTE — PROGRESS NOTES
[facility]    Stroke Progress Note    Interval Events  Uneventful night. Improved dysarthria.     Impression  Ischemic Stroke due to cardioembolism Likely AFIB    Work up  -Hemoglobin A1C 5.7  -LDL 94  -MRI brain: Areas of infarct within bilateral cerebral hemispheres also  involving the left perirolandic region likely accounting for the  right-sided weakness/numbness. Given the bilateral infarcts, these are  favored to be embolic in nature  -CTA h/n:  1. Moderate atherosclerotic disease with calcified and noncalcified  plaque in the proximal left internal carotid artery resulting in 70%  stenosis. This is best seen on the 3-D reformatted isolated left  siphon internal carotid artery images.  2. 50% stenosis at the origin left vertebral artery.  -TTE:   There are regional wall motion abnormalities as specified.  The mid to distal anterospetum and mid inferoseptum appears severely hypokinetic to akinetic. The apex in some views appear at least mildly hypokinetic.   This is an unusual appearance as the anterior wall does not appear hypokinetic and so the regional wall motion abnormalities are not conforming to coronary territories.  The visual ejection fraction is estimated at 45-50%.  Left ventricular systolic function is borderline reduced.  The rhythm was atrial fibrillation.  Normal left atrial size. Right atrial size is normal. There is no color Doppler evidence of an atrial shunt.    Recommendations  - No more imaging needed  - Recommend to start Eliquis (dosed per age and CrCl) tonight. Need to be run by pharmacy liaison   - No need of aspirin or plavix  - Lipitor 20 mg daily for 3 months  - PT/OT/SLP- Ok to be discharged home if cleared by PT/SLP  - Stroke education  - Sleep apnea screen    Patient Follow-Up  - Follow-up Neurology in 4-6 weeks    Will sign off. Please call us with any questions.    Sameer Salgado MD  Fellow, Vascular Neurology  Pager:   [phone]    ______________________________________________________    Medications   Home Meds  Prior to Admission medications    Not on File       Scheduled Meds    atorvastatin  20 mg Oral or NG Tube Daily at 8 pm     [START ON 10/9/2019] influenza Vac Split High-Dose  0.5 mL Intramuscular Prior to discharge     labetalol  10 mg Intravenous Once     sodium chloride (PF)  10 mL Intravenous Once     sodium chloride (PF)  3 mL Intracatheter Q8H       Infusion Meds    - MEDICATION INSTRUCTIONS -       - MEDICATION INSTRUCTIONS -         PRN Meds  acetaminophen, acetaminophen, bisacodyl, labetalol, lidocaine 4%, lidocaine (buffered or not buffered), melatonin, naloxone, - MEDICATION INSTRUCTIONS -, ondansetron **OR** ondansetron, polyethylene glycol, prochlorperazine **OR** prochlorperazine **OR** prochlorperazine, senna-docusate **OR** senna-docusate, sodium chloride (PF), - MEDICATION INSTRUCTIONS -       PHYSICAL EXAMINATION  Temp:  [97.3  F (36.3  C)-99.1  F (37.3  C)] 99.1  F (37.3  C)  Pulse:  [35-80] 56  Heart Rate:  [26-80] 62  Resp:  [0-143] 35  BP: (111-191)/() 111/66  SpO2:  [94 %-99 %] 97 %     Mental Status:  alert, oriented x 3, follows commands, naming and repetition normal, no aphasia  Cranial Nerves:  visual fields intact, PERRL, EOMI with normal smooth pursuit, facial sensation intact and symmetric, hearing not formally tested but intact to conversation, palate elevation symmetric and uvula midline, very subtle dysarthria, shoulder shrug strong bilaterally, tongue protrusion midline, mild facial asymmetry  Motor:  normal muscle tone and bulk, no abnormal movements, 5/5 all extremities  Reflexes:  toes down-going  Sensory:  decreased sensations in right arm  Coordination:  normal finger-to-nose and heel-to-shin bilaterally without dysmetria, rapid alternating movements symmetric  Station/Gait:  deferred      Stroke Scales    NIHSS  Interval 24 hrs post treatment (10/08/19 1307)   Interval  Comments     1a. Level of Consciousness 0-->Alert, keenly responsive   1b. LOC Questions 0-->Answers both questions correctly   1c. LOC Commands 0-->Performs both tasks correctly   2.   Best Gaze 0-->Normal   3.   Visual 0-->No visual loss   4.   Facial Palsy 1-->Minor paralysis (flattened nasolabial fold, asymmetry on smiling)   5a. Motor Arm, Left 0-->No drift, limb holds 90 (or 45) degrees for full 10 secs   5b. Motor Arm, Right 0-->No drift, limb holds 90 (or 45) degrees for full 10 secs   6a. Motor Leg, Left 0-->No drift, leg holds 30 degree position for full 5 secs   6b. Motor Leg, right 0-->No drift, leg holds 30 degree position for full 5 secs   7.   Limb Ataxia 0-->Absent   8.   Sensory 1-->Mild-to-moderate sensory loss, patient feels pinprick is less sharp or is dull on the affected side, or there is a loss of superficial pain with pinprick, but patient is aware of being touched   9.   Best Language 0-->No aphasia, normal   10. Dysarthria 0-->Normal   11. Extinction and Inattention  0-->No abnormality   Total 2 (10/08/19 1307)       Imaging  I personally reviewed all imaging; relevant findings per HPI.     Lab Results Data   CBC  Recent Labs   Lab 10/08/19  0611 10/07/19  1644   WBC 11.9* 21.4*   RBC 4.69 5.20   HGB 15.0 16.4   HCT 45.0 49.5    270     Basic Metabolic Panel    Recent Labs   Lab 10/08/19  0611 10/07/19  1644    139   POTASSIUM 3.8 4.1   CHLORIDE 106 103   CO2 28 31   BUN 22 30   CR 1.12 1.25   GLC 90 98   CRUZ 8.6 9.3     Liver Panel  Recent Labs   Lab Test 10/08/19  0611   PROTTOTAL 5.8*   ALBUMIN 3.0*   BILITOTAL 1.0   ALKPHOS 63   AST 23   ALT 26     INR  Recent Labs   Lab Test 10/07/19  1644   INR 1.03      Lipid Profile  Recent Labs   Lab Test 10/08/19  0611   CHOL 162   HDL 54   LDL 94   TRIG 70     A1C  Recent Labs   Lab Test 10/08/19  0611 12/12/11  0820   A1C 5.7* 5.3     Troponin I  Recent Labs   Lab 10/08/19  0611 10/07/19  2221   TROPI 0.048* 0.053*

## 2019-10-08 NOTE — PROGRESS NOTES
[facility]    Hospitalist Progress Note    Assessment & Plan      Tu Barraza is an 84-year-old otherwise healthy male, not on medications at baseline, who presents to the emergency room today for evaluation of right-sided weakness and numbness.  A code stroke was called and he was ultimately given TPA this evening.  He will be admitted to ICU for ongoing evaluation and care.     Suspected acute ischemic stroke, status post tPA.   - He was given tPA at 1718 this evening.   - monitored in the Intensive Care Unit on admission   -nl tsh,   -+ Afib, unclear onset  - Lipids panel, total chol 162, LDL 94 goal <70, HDL 54.   -HbA1C 5.7%  -mild elevation in troponins: 0.05 --> 0.048. no anginal sxs  -Given afib, suspect cardioembolic    Today: no RN concerns  Remains in afib  Still with mild numbness RUE  Nl neuro exam today otherwise    Plan:   - Will obtain an MRI of the brain- result pending. Had this am  -repeat a head CT at 24 hours post-TPA administration   - Continue neuro checks per routine.   -neurology following  -echo  -nutrition consult  - start high dose Lipitor 40mg at bedtime with lipids in 6 weeks  -All antiplatelet therapy and anticoagulation will be held for the first 24 hours post-TPA.   -Goal blood pressures is less than 180/105.  Labetalol is available as needed. - PT, OT and speech therapy evals have also been placed per protocol. - Appreciate ongoing input from the Neurology Service in regards to continued care.       Addendum:   MRI brain shows embolic strokes  IMPRESSION:    1. Areas of infarct within bilateral cerebral hemispheres also  involving the left perirolandic region likely accounting for the  right-sided weakness/numbness. Given the bilateral infarcts, these are  favored to be embolic in nature. No significant mass effect or midline  shift. No evidence of hemorrhagic transformation.  2. Moderate diffuse parenchymal volume loss and mild white matter  changes likely due to  chronic microvascular ischemic disease.      Discussed with neurology. May start anticoagulation tonight at 2000. Follow up [facility] clinic neurology in 6 weeks, see pcp next week, Lipitor 20mg at bedtime for 3 months for inflammation with strokes then stop. Lifelong anticoagulation.     Left carotid artery stenosis.    -As above, CTA of the head and neck showed 70% stenosis in the proximal left internal carotid artery and 50% stenosis at the origin of the vertebral artery.  Ongoing management per Neurology recommendations as above.  Anticipate initiation of a statin once safe for p.o. intake.  Blood pressure management as above. Lipids panel, total chol 162, LDL 94 goal <70, HDL 54.   -discussed with neurology and not thought significant and requiring statin. Statin for embolic stroke inflammation. Short course        Atrial fibrillation,  -rate controlled.  Onset unclear.  He denies any history of chest pain, palpitations, dizziness or lightheadedness.  He is quite active and works out regularly.  He tells me he sometimes gets his heart rate up to 130s and then easily recovers to his baseline pulse in the 50s to 60s.   -slow ventricular response.   -HR intermitently down in 30 range. No pauses/heart block  -TSH 1.6. nl K  - rate controlled.   -Echo  - Will have to discuss anticoagulation in the coming days.   -Pharm liason consult for NOaC coverage- Eliquis covered. Given wt and Cr he is candidate for 5mg bid.     Addendum:   There are regional wall motion abnormalities as specified.  The mid to distal anterospetum and mid inferoseptum appears severely  hypokinetic to akinetic. The apex in some views appear at least mildly  hypokinetic. This is an unusual appearance as the anterior wall does not  appear hypokinetic and so the regional wall motion abnormalities are not  conforming to coronary territories.  The visual ejection fraction is estimated at 45-50%.  Left ventricular systolic function is borderline  reduced.  The rhythm was atrial fibrillation.  There is no comparison study available. The study was technically difficult.    ? Stress cardiomyopathy-Takotsubo pattern    No cp, no exercise intolerance. Euvolemic. Will consult cardiology tomorrow for CM and WMA.     Monitor overnight. Follow bradycardia overnight. Cardiology consult placed.   Pt feeling well currently without cardiopulmonary symptoms     Leukocytosis.   - On presentation, his white blood cell count was 21.4.  He is afebrile.  He notes recent symptoms of a cough, cold and congestion and just completed a Z-TITA and a course of prednisone.  Leukocytosis may well be reactive in the setting of recent steroids versus above.  He is otherwise afebrile.   Wbc 21--> 11.9 today . Afebrile. No infectious sxs   sxs for which he was recently prescribed prednisone and zpack have resolved. Nl exam    Plan:   -no indication to pursue infectious etiology at this time.   -follow , am cbc     Borderline creatinine.    -His creatinine this evening is borderline elevated at 1.25 with estimated GFR of 52.  He has no prior history of renal dysfunction. Improved overnight to 1.1  - am bmp     Prediabetes  New dx. HbA1C 5.7% admission  Follow up pcp  Nutrition consult before discharge  Dm diet    Deep venous thrombosis prophylaxis:    -PCDs only given he received TPA earlier this evening.      CODE STATUS:  The patient is full code.     Dispo: > 2days, icu today. Transfer to neuro floor 24 hours post tpa    Mahesh Yancey MD  Text Page  (7am to 6pm)  Interval History   Neurologically intact overnight.   S/p tpa yesterday  Slow afib overnight    -Data reviewed today: I reviewed all new labs and imaging results over the last 24 hours. I personally reviewed imaging and labs since admission.     Physical Exam   Temp: 99.1  F (37.3  C) Temp src: Oral BP: 127/76 Pulse: 56 Heart Rate: (!) 43 Resp: 8 SpO2: 99 % O2 Device: None (Room air)    Vitals:    10/07/19 1641 10/08/19 0300    Weight: 74.8 kg (165 lb) 74.3 kg (163 lb 12.8 oz)     Vital Signs with Ranges  Temp:  [97.3  F (36.3  C)-99.1  F (37.3  C)] 99.1  F (37.3  C)  Pulse:  [35-80] 56  Heart Rate:  [26-79] 43  Resp:  [0-143] 8  BP: (119-191)/() 127/76  SpO2:  [95 %-99 %] 99 %  I/O last 3 completed shifts:  In: 30 [P.O.:30]  Out: 1650 [Urine:1650]    Constitutional: Sitting up in  Bed, nad  HEENT: nl sclera, eomi, perrla  Respiratory: CTAB  Cardiovascular: irreg irreg rhythm.no r/g/m  GI: soft, nt,nd  Skin/Integumen:  No rash or edema  Psych: nl affect  Neuro: oriented, strength 5/5 extrem X 4, face symmetric, tongue midline, finger nose finger seems fine- slightly off with RUE.  Nl speech and mentation       Medications     - MEDICATION INSTRUCTIONS -       - MEDICATION INSTRUCTIONS -         atorvastatin  40 mg Oral or NG Tube Daily at 8 pm     [START ON 10/9/2019] influenza Vac Split High-Dose  0.5 mL Intramuscular Prior to discharge     labetalol  10 mg Intravenous Once     sodium chloride (PF)  10 mL Intravenous Once     sodium chloride (PF)  3 mL Intracatheter Q8H       Data   Recent Labs   Lab 10/08/19  0611 10/07/19  2221 10/07/19  1644   WBC 11.9*  --  21.4*   HGB 15.0  --  16.4   MCV 96  --  95     --  270   INR  --   --  1.03     --  139   POTASSIUM 3.8  --  4.1   CHLORIDE 106  --  103   CO2 28  --  31   BUN 22  --  30   CR 1.12  --  1.25   ANIONGAP 5  --  5   CRUZ 8.6  --  9.3   GLC 90  --  98   ALBUMIN 3.0*  --   --    PROTTOTAL 5.8*  --   --    BILITOTAL 1.0  --   --    ALKPHOS 63  --   --    ALT 26  --   --    AST 23  --   --    TROPI 0.048* 0.053*  --        Imaging:   Recent Results (from the past 24 hour(s))   CT Head w/o Contrast    Narrative    CT SCAN OF THE HEAD WITHOUT CONTRAST   10/7/2019 4:58 PM     HISTORY: Right arm numbness and weakness starting at 1430. Code  stroke.    TECHNIQUE:  Axial images of the head and coronal reformations without  IV contrast material.  Radiation dose for this scan  was reduced using  automated exposure control, adjustment of the mA and/or kV according  to patient size, or iterative reconstruction technique.    COMPARISON: None.    FINDINGS: There is some mild cerebral atrophy. Some minimal  nonspecific white matter changes are present without mass effect.  There is no evidence for intracranial hemorrhage, mass effect, acute  infarct, or skull fracture. Visualized paranasal sinuses and mastoid  air cells are clear.      Impression    IMPRESSION: Chronic changes. No evidence for intracranial hemorrhage  or any acute process.    KORY GONZALEZ MD   CTA Head Neck with Contrast    Narrative    CTA  HEAD NECK WITH CONTRAST 10/7/2019 5:00 PM     HISTORY: Code stroke. Right arm numbness and weakness.    TECHNIQUE: Axial images were obtained through the head and neck with  intravenous contrast. 70 mL of Isovue-370 was given. Multiplanar  reconstructions were performed. 3-D reconstructions off a remote  workstation for CT angiography were also acquired. Carotid stenoses  were evaluated by comparing the caliber of the proximal internal  carotid artery to the caliber of the distal internal carotid artery.  Radiation dose for this scan was reduced using automated exposure  control, adjustment of the mA and/or kV according to patient size, or  iterative reconstruction technique..    FINDINGS:    Brachiocephalic vessels: Minimal calcific and noncalcified plaque. No  stenosis.    Right carotid system: Mild calcific plaque. No stenosis or dissection.    Left carotid system: Moderate calcified and noncalcified plaque  resulting in approximately 50% stenosis.    Right vertebral artery: Normal.    Left vertebral artery: Calcified plaque is seen near the origin. There  is approximately 50% stenosis. No dissection.    [place]: Normal.    Other findings: Some degenerative changes are seen in the spine.      Impression    IMPRESSION:  1. Moderate atherosclerotic disease with calcified and  noncalcified  plaque in the proximal left internal carotid artery resulting in 70%  stenosis. This is best seen on the 3-D reformatted isolated left  siphon internal carotid artery images.  2. 50% stenosis at the origin left vertebral artery.  3. No evidence for intracranial thromboembolism or aneurysm.    KORY GONZALEZ MD   CT Head Perfusion w Contrast    Narrative    CT HEAD PERFUSION WITH CONTRAST 10/7/2019 5:11 PM     HISTORY: Code stroke. Right arm numbness and weakness.    TECHNIQUE: Axial images were obtained through the brain with  intravenous contrast for CT brain perfusion imaging. Radiation dose  for this scan was reduced using automated exposure control, adjustment  of the mA and/or kV according to patient size, or iterative  reconstruction technique..    FINDINGS: Cerebral blood flow, cerebral blood volume, mean transit  time maps show some questionable altered perfusion in the left frontal  lobe. Exam is otherwise unremarkable.      Impression    IMPRESSION: Questionable altered perfusion in left frontal lobe on  perfusion imaging. If indicated, MRI might be helpful in further  evaluation.    KORY GONZALEZ MD

## 2019-10-08 NOTE — PROGRESS NOTES
10/08/19 1345   Quick Adds   Type of Visit Initial Occupational Therapy Evaluation   Living Environment   Lives With spouse   Living Arrangements   ([facility])   Home Accessibility stairs to enter home;stairs within home   Number of Stairs, Main Entrance 1   Stair Railings, Main Entrance none   Number of Stairs, Within Home, Primary 6  (2 sets of 6 stairs/split level )   Stair Railings, Within Home, Primary railings safe and in good condition   Transportation Anticipated car, drives self;family or friend will provide   Self-Care   Usual Activity Tolerance good   Current Activity Tolerance good   Regular Exercise Yes   Activity/Exercise Type walking;strength training   Exercise Amount/Frequency 3-5 times/wk   Equipment Currently Used at Home none   Activity/Exercise/Self-Care Comment Lif[employer] fitness at least 4x/week, cardio and weight lifting, core ex's   Functional Level   Ambulation 0-->independent   Transferring 0-->independent   Toileting 0-->independent   Bathing 0-->independent   Dressing 0-->independent   Eating 0-->independent   Communication 0-->understands/communicates without difficulty   Swallowing 0-->swallows foods/liquids without difficulty   Cognition 0 - no cognition issues reported   Fall history within last six months no   Which of the above functional risks had a recent onset or change? none   Prior Functional Level Comment Indep all IADLS, mob no AD   General Information   Onset of Illness/Injury or Date of Surgery - Date 10/07/19   Patient/Family Goals Statement return home   Additional Occupational Profile Info/Pertinent History of Current Problem 83yo male admitted with R sided numbness, some mildly slurred speech. CT/MRI indicated ischemic stroke, received tPA.    Visual Perception   Visual Perception No deficits were identified   Sensory Examination   Sensory Comments R arm mild numbness, resolved in leg   Pain Assessment   Patient Currently in Pain No   Range of Motion (ROM)   ROM Quick  "Adds No deficits were identified   Strength   Manual Muscle Testing Quick Adds No deficits were identified   Strength Comments BLE and UE 5/5   Coordination   Upper Extremity Coordination No deficits were identified   Mobility   Bed Mobility Comments Independent   Transfer Skill: Sit to Stand   Level of [place]: Sit/Stand independent   Transfer Skill: Toilet Transfer   Level of [place]: Toilet independent   Lower Body Dressing   Level of [place]: Dress Lower Body independent   Eating/Self Feeding   Level of [place]: Eating independent   General Therapy Interventions   Planned Therapy Interventions ADL retraining;cognition   Clinical Impression   Criteria for Skilled Therapeutic Interventions Met yes, treatment indicated   OT Diagnosis decreased IADL performance   Influenced by the following impairments R sided weakness and numbness and slurred speech at admit   Clinical Decision Making (Complexity) Low complexity   Predicted Duration of Therapy Intervention (days/wks) Eval and one treatment only   Anticipated Discharge Disposition Home   Risks and Benefits of Treatment have been explained. Yes   Patient, Family & other staff in agreement with plan of care Yes   [facility] TM \"6 Clicks\"   2016, [employer] of [employer], under license to [employer].  All rights reserved.   6 Clicks Short Forms Daily Activity Inpatient Short Form   [facility]  \"6 Clicks\" Daily Activity Inpatient Short Form   1. Putting on and taking off regular lower body clothing? 4 - None   2. Bathing (including washing, rinsing, drying)? 4 - None   3. Toileting, which includes using toilet, bedpan or urinal? 4 - None   4. Putting on and taking off regular upper body clothing? 4 - None   5. Taking care of personal grooming such as brushing teeth? 4 - None   6. Eating meals? 4 - None   Daily Activity Raw Score (Score out of 24.Lower scores equate to lower levels of function) 24   Total Evaluation " Time   Total Evaluation Time (Minutes) 15

## 2019-10-08 NOTE — H&P
Admitted:     10/07/2019      PRIMARY CARE PHYSICIAN:  [facility].      CHIEF COMPLAINT:  Right-sided numbness and weakness.      HISTORY OF PRESENT ILLNESS:  Tu Barraza is a very pleasant 84-year-old male with no significant past medical history who was brought to the emergency room today for evaluation of new onset right-sided weakness, numbness and tingling.  He woke this morning in his usual state of health.  He went to the gym for a workout as he typically does.  He says he typically walks and runs on the treadmill and does pushups.  After his workout he felt kind of tired so he sat in the hot tub.  It was at that time that he noted some right-sided weakness, numbness and tingling.  His wife had concerns that he did not seem right and he was evaluated by staff at his gym.  She relates that they told him he had a regular pulse.  He had some improvement in his symptoms and they left and went home.  A short time after arriving home, he noted a recurrence of his weakness and numbness involving his right side, more predominantly in his upper extremities than lower extremities.  EMS was called and he was brought to the emergency room for further evaluation.  His wife gave him an aspirin prior to coming to the hospital.     In the emergency room, he was seen and evaluated by Dr. Mcgrath.  A code stroke was called and he was seen by the Stroke Neurology Service as well.  He had some right upper extremity drift and numbness.  He was also observed by the stroke service to have some mild dysarthria and facial droop.  He was noted to be in new onset atrial fibrillation.  He has no known prior history of arrhythmia.  He has otherwise been well-appearing.  He had some chest congestion last week and was treated with a Z-TITA and some prednisone, but otherwise no complaints of chest pain, shortness of breath, palpitations, abdominal pain, nausea, vomiting or changes in bowel or bladder habits.      In the  emergency room, he was afebrile.  He was initially hypertensive with blood pressure 170/89, O2 sats were stable on room air.  He was found to be in rate controlled afib.  Labs were relatively unremarkable save for a leukocytosis with a white count of 21.4.  Stat head imaging was obtained including a CT perfusion, CT without contrast, and a CT of the head and neck.  There was some question of altered perfusion involving the left frontal lobe.  No evidence of intracranial hemorrhage.  He was also noted to have some moderate atherosclerotic disease in the left internal carotid artery with 70% stenosis.  Per the recommendations of the Stroke Neurology Service, he was given tPA at 1718.  Plan at this juncture is to admit him to the Intensive Care Unit following TPA administration for ongoing evaluation and care.        By the time I saw the patient, his condition appeared to be improving.  He had minimal weakness in his right upper extremity with some mild lingering drift.  I did not appreciate any facial droop and speech pattern was otherwise clear.      PAST MEDICAL HISTORY:  No known prior medical problems.      PAST SURGICAL HISTORY:  No prior surgical procedures.      FAMILY HISTORY:  He notes his father had a history of colorectal cancer.  He does not recall any family members having history of cardiovascular disease or diabetes.      SOCIAL HISTORY:  He had a history of tobacco use but quit smoking in the 1980s.  He drinks alcohol approximately 3-4 times a week.  He lives at home with his wife.  He stays active and goes to the gym regularly.      HOME MEDICATIONS:    None.  He was recently prescribed a course of prednisone and a Z-TITA.      ALLERGIES:  PENICILLINS ARE DOCUMENTED, BUT NO REACTION KNOWN.      REVIEW OF SYSTEMS:  As above, full 10-point review of systems was obtained and negative unless otherwise stated per HPI.      PHYSICAL EXAMINATION:   VITAL SIGNS:  Temperature 98.7, pulse 62, respirations 18,  blood pressure 156/89, O2 sat 98% on room air.   GENERAL:  The patient is a well-nourished, well-developed male who appears his stated age, is alert, conversing appropriately.  Appears to be in no acute distress.   NEUROLOGIC:  He is alert and oriented x3.  Cranial nerves II-XII are intact.  I do not appreciate any facial asymmetry or droop.  Strength is mildly diminished in the right upper extremity as compared to left with some right upper extremity drift.  Strength appears intact in the lower extremities bilaterally.  Sensation is intact to light touch throughout.  Gait was not assessed.   CARDIOVASCULAR:  Heart rate is irregularly irregular with no murmurs, gallops, rubs.  Pulses are +2 and symmetric in bilateral upper extremities.  There is no lower extremity edema.   RESPIRATORY:  Lungs are clear to auscultation bilaterally, free of wheezes, rales or rhonchi, no increased work of breathing or accessory muscle.   ABDOMEN:  Soft, nontender, nondistended, positive bowel sounds throughout.   INTEGUMENTARY:  Warm, dry, no rashes, jaundice or ecchymosis.      LABORATORY DATA AND IMAGING:    BMP shows sodium 139, potassium 4.1, creatinine 1.25, glucose 98.  CBC showed a white count of 21.4, hemoglobin 6.4, platelet count of 270.      Stat head imaging included a head CT without contrast that showed chronic changes, no evidence of intracranial hemorrhage or acute process.      Head CT perfusion with contrast showed a questionable altered perfusion of the left frontal lobe.    CTA of the head and neck showed moderate atherosclerotic disease with calcified and noncalcified plaques in the left proximal internal carotid artery resulting in 70% stenosis and a 50% stenosis at the origin of the left vertebral artery.  No other evidence of intracranial thromboembolism or aneurysm.      ASSESSMENT AND PLAN:  Tu Barraza is an 84-year-old otherwise healthy male, not on medications at baseline, who presents to the emergency  room today for evaluation of right-sided weakness and numbness.  A code stroke was called and he was ultimately given TPA this evening.  He will be admitted to ICU for ongoing evaluation and care.   1.  Suspected acute ischemic stroke, status post tPA.  He was given tPA at 1718 this evening.  He will be monitored in the Intensive Care Unit.  Will obtain an MRI of the brain, repeat a head CT at 24 hours post-TPA administration or sooner if he develops any neurologic changes.  Continue neuro checks per routine.  He will undergo additional evaluation with basic labs including fasting lipid panel, A1c and evaluation with echocardiogram or as above.  As above, in the emergency room, he was noted to be in AFib.  He has no prior history of arrhythmia.  All antiplatelet therapy and anticoagulation will be held for the first 24 hours post-TPA.  Goal blood pressures is less than 180/105.  Labetalol is available as needed.  PT, OT and speech therapy evals have also been placed per protocol.  Appreciate ongoing input from the Neurology Service in regards to continued care.   2.  Left carotid artery stenosis.  As above, CTA of the head and neck showed 70% stenosis in the proximal left internal carotid artery and 50% stenosis at the origin of the vertebral artery.  Ongoing management per Neurology recommendations as above.  Anticipate initiation of a statin once safe for p.o. intake.  Blood pressure management as above.   3.  Atrial fibrillation, rate controlled.  Onset unclear.  He denies any history of chest pain, palpitations, dizziness or lightheadedness.  He is quite active and works out regularly.  He tells me he sometimes gets his heart rate up to 130s and then easily recovers to his baseline pulse in the 50s to 60s.  At present he is rate controlled.  Further evaluation with an echocardiogram, as above.  Check a TSH.  Will have to discuss anticoagulation in the coming days.   4.  Leukocytosis.  On presentation, his white  blood cell count was 21.4.  He is afebrile.  He notes recent symptoms of a cough, cold and congestion and just completed a Z-TITA and a course of prednisone.  Leukocytosis may well be reactive in the setting of recent steroids versus above.  He is otherwise afebrile.  I do not see any indication to pursue infectious etiology at this time.   5.  Borderline creatinine.  His creatinine this evening is borderline elevated at 1.25 with estimated GFR of 52.  He has no prior history of renal dysfunction.  We will monitor labs for the time being.  Otherwise, he can follow up with PCP.     Deep venous thrombosis prophylaxis:  PCDs only given he received TPA earlier this evening.      CODE STATUS:  The patient is full code.         MONSTER KHANNA DO             D: 10/07/2019   T: 10/07/2019   MT: STEPHANIE      Name:     JODY LEO   MRN:      [id number]        Account:      [id number]   :      1935        Admitted:     10/07/2019                   Document: [id number]

## 2019-10-08 NOTE — PROGRESS NOTES
Handoff at 1900 to DEREK Hedrick.     Patient completely neurologically intact ex dull sensation to LUE and LLE when compared to RUE and RLE. Will continue to monitor per [facility] protocol, Family updated at bedside.

## 2019-10-09 ENCOUNTER — APPOINTMENT (OUTPATIENT)
Dept: SPEECH THERAPY | Facility: CLINIC | Age: 84
DRG: 062 | End: 2019-10-09
Payer: COMMERCIAL

## 2019-10-09 VITALS
TEMPERATURE: 98 F | WEIGHT: 163.8 LBS | RESPIRATION RATE: 16 BRPM | SYSTOLIC BLOOD PRESSURE: 142 MMHG | DIASTOLIC BLOOD PRESSURE: 84 MMHG | BODY MASS INDEX: 24.26 KG/M2 | HEIGHT: 69 IN | HEART RATE: 55 BPM | OXYGEN SATURATION: 98 %

## 2019-10-09 PROBLEM — I42.9 CARDIOMYOPATHY (H): Status: ACTIVE | Noted: 2019-10-09

## 2019-10-09 LAB
ANION GAP SERPL CALCULATED.3IONS-SCNC: 4 MMOL/L (ref 3–14)
BUN SERPL-MCNC: 25 MG/DL (ref 7–30)
CALCIUM SERPL-MCNC: 8.5 MG/DL (ref 8.5–10.1)
CHLORIDE SERPL-SCNC: 107 MMOL/L (ref 94–109)
CO2 SERPL-SCNC: 27 MMOL/L (ref 20–32)
CREAT SERPL-MCNC: 1.08 MG/DL (ref 0.66–1.25)
ERYTHROCYTE [DISTWIDTH] IN BLOOD BY AUTOMATED COUNT: 13.1 % (ref 10–15)
GFR SERPL CREATININE-BSD FRML MDRD: 63 ML/MIN/{1.73_M2}
GLUCOSE SERPL-MCNC: 91 MG/DL (ref 70–99)
HCT VFR BLD AUTO: 45.2 % (ref 40–53)
HGB BLD-MCNC: 15 G/DL (ref 13.3–17.7)
MCH RBC QN AUTO: 31.8 PG (ref 26.5–33)
MCHC RBC AUTO-ENTMCNC: 33.2 G/DL (ref 31.5–36.5)
MCV RBC AUTO: 96 FL (ref 78–100)
PLATELET # BLD AUTO: 215 10E9/L (ref 150–450)
POTASSIUM SERPL-SCNC: 3.9 MMOL/L (ref 3.4–5.3)
RBC # BLD AUTO: 4.71 10E12/L (ref 4.4–5.9)
SODIUM SERPL-SCNC: 138 MMOL/L (ref 133–144)
WBC # BLD AUTO: 11.9 10E9/L (ref 4–11)

## 2019-10-09 PROCEDURE — 25000128 H RX IP 250 OP 636: Performed by: INTERNAL MEDICINE

## 2019-10-09 PROCEDURE — 25000132 ZZH RX MED GY IP 250 OP 250 PS 637: Performed by: INTERNAL MEDICINE

## 2019-10-09 PROCEDURE — 99239 HOSP IP/OBS DSCHRG MGMT >30: CPT | Performed by: INTERNAL MEDICINE

## 2019-10-09 PROCEDURE — 99222 1ST HOSP IP/OBS MODERATE 55: CPT | Performed by: INTERNAL MEDICINE

## 2019-10-09 PROCEDURE — 92526 ORAL FUNCTION THERAPY: CPT | Mod: GN | Performed by: SPEECH-LANGUAGE PATHOLOGIST

## 2019-10-09 PROCEDURE — 80048 BASIC METABOLIC PNL TOTAL CA: CPT | Performed by: INTERNAL MEDICINE

## 2019-10-09 PROCEDURE — 36415 COLL VENOUS BLD VENIPUNCTURE: CPT | Performed by: INTERNAL MEDICINE

## 2019-10-09 PROCEDURE — 85027 COMPLETE CBC AUTOMATED: CPT | Performed by: INTERNAL MEDICINE

## 2019-10-09 PROCEDURE — 90662 IIV NO PRSV INCREASED AG IM: CPT | Performed by: INTERNAL MEDICINE

## 2019-10-09 RX ORDER — ATORVASTATIN CALCIUM 40 MG/1
40 TABLET, FILM COATED ORAL DAILY
Qty: 30 TABLET | Refills: 0 | Status: SHIPPED | OUTPATIENT
Start: 2019-10-09 | End: 2019-12-10

## 2019-10-09 RX ADMIN — APIXABAN 5 MG: 5 TABLET, FILM COATED ORAL at 08:13

## 2019-10-09 RX ADMIN — INFLUENZA A VIRUS A/MICHIGAN/45/2015 X-275 (H1N1) ANTIGEN (FORMALDEHYDE INACTIVATED), INFLUENZA A VIRUS A/SINGAPORE/INFIMH-16-0019/2016 IVR-186 (H3N2) ANTIGEN (FORMALDEHYDE INACTIVATED), AND INFLUENZA B VIRUS B/MARYLAND/15/2016 BX-69A (A B/COLORADO/6/2017-LIKE VIRUS) ANTIGEN (FORMALDEHYDE INACTIVATED) 0.5 ML: 60; 60; 60 INJECTION, SUSPENSION INTRAMUSCULAR at 11:30

## 2019-10-09 ASSESSMENT — ACTIVITIES OF DAILY LIVING (ADL)
ADLS_ACUITY_SCORE: 8

## 2019-10-09 NOTE — PLAN OF CARE
Discharge to home. Will follow up with PCP, cardiology and neurology as instructed. Discharge education/medications complete. Transport arranged with wife.

## 2019-10-09 NOTE — DISCHARGE SUMMARY
[facility]    Discharge Summary  Hospitalist    Date of Admission:  10/7/2019  Date of Discharge:  10/9/2019  1:10 PM  Discharging Provider: Mahesh Yancey MD    Discharge Diagnoses   Cardioembolic strokes with right sided weakness/R facial droop, s/p TPA with resolution  New diagnosis for rate controlled atrial fibrillation   Prediabetes  Moderate Carotid Stenosis  Cardiomyopathy:  With anteroseptal/inferoseptal regional wall motion abnormalities and ECG suggestive of prior infarction (versus variant stress cardiomyopathy), asymptomatic.      History of Present Illness   Tu Barraza is a very pleasant 84-year-old male with no significant past medical history who was brought to the emergency room today for evaluation of new onset right-sided weakness, numbness and tingling.  He woke this morning in his usual state of health.  He went to the gym for a workout as he typically does.  He says he typically walks and runs on the treadmill and does pushups.  After his workout he felt kind of tired so he sat in the hot tub.  It was at that time that he noted some right-sided weakness, numbness and tingling.  His wife had concerns that he did not seem right and he was evaluated by staff at his gym.  She relates that they told him he had a regular pulse.  He had some improvement in his symptoms and they left and went home.  A short time after arriving home, he noted a recurrence of his weakness and numbness involving his right side, more predominantly in his upper extremities than lower extremities.  EMS was called and he was brought to the emergency room for further evaluation.  His wife gave him an aspirin prior to coming to the hospital.     In the emergency room, he was seen and evaluated by Dr. Mcgrath.  A code stroke was called and he was seen by the Stroke Neurology Service as well.  He had some right upper extremity drift and numbness.  He was also observed by the stroke service to have some mild  dysarthria and facial droop.  He was noted to be in new onset atrial fibrillation.  He has no known prior history of arrhythmia.  He has otherwise been well-appearing.  He had some chest congestion last week and was treated with a Z-TITA and some prednisone, but otherwise no complaints of chest pain, shortness of breath, palpitations, abdominal pain, nausea, vomiting or changes in bowel or bladder habits.       In the emergency room, he was afebrile.  He was initially hypertensive with blood pressure 170/89, O2 sats were stable on room air.  He was found to be in rate controlled afib.  Labs were relatively unremarkable save for a leukocytosis with a white count of 21.4.  Stat head imaging was obtained including a CT perfusion, CT without contrast, and a CT of the head and neck.  There was some question of altered perfusion involving the left frontal lobe.  No evidence of intracranial hemorrhage.  He was also noted to have some moderate atherosclerotic disease in the left internal carotid artery with 70% stenosis.  Per the recommendations of the Stroke Neurology Service, he was given tPA at 1718.  Plan at this juncture is to admit him to the Intensive Care Unit following TPA administration for ongoing evaluation and care.        By the time I saw the patient, his condition appeared to be improving.  He had minimal weakness in his right upper extremity with some mild lingering drift.  I did not appreciate any facial droop and speech pattern was otherwise clear.     Hospital Course   Tu Barraza was admitted on 10/7/2019.  The following problems were addressed during his hospitalization:    Principal Problem:    Acute ischemic stroke (H)  Active Problems:    Atrial fibrillation, new onset (H)    Prediabetes    Cardiomyopathy (H)    Tu Barraza is an 84-year-old otherwise healthy male, not on medications at baseline, who presents to the emergency room today for evaluation of right-sided weakness and numbness.  A code  stroke was called and he was ultimately given TPA this evening.  He will be admitted to ICU for ongoing evaluation and care.      Cardioembolic CVA,  status post tPA.   - He was given tPA at 1718 evening of admission in ED  -head CT without contrast:showed Chronic changes. No evidence for intracranial hemorrhage or any acute process.  -CT head showed Questionable altered perfusion in left frontal lobe on  perfusion imaging.   -CTA neck: 1. Moderate atherosclerotic disease with calcified and noncalcified  plaque in the proximal left internal carotid artery resulting in 70%  stenosis. This is best seen on the 3-D reformatted isolated left  siphon internal carotid artery images.  2. 50% stenosis at the origin left vertebral artery.  3. No evidence for intracranial thromboembolism or aneurysm.  - monitored in the Intensive Care Unit on admission for 24 hours  -nl tsh,   -+ Afib, unclear onset  - Lipids panel, total chol 162, LDL 94 goal <70, HDL 54.   -HbA1C 5.7%  -mild elevation in troponins: 0.05 --> 0.048. no anginal sxs  -Given afib, suspect cardioembolic   - pt had near normalization of his neuro exam. At time of discharge he had mild R facial droop/mild facial asymmetry  -seen by PT, OT, speech and no therpy needs   -MRI Brain showed: multiple bilateral infarcts  IMPRESSION:    1. Areas of infarct within bilateral cerebral hemispheres also  involving the left perirolandic region likely accounting for the  right-sided weakness/numbness. Given the bilateral infarcts, these are  favored to be embolic in nature. No significant mass effect or midline  shift. No evidence of hemorrhagic transformation.  2. Moderate diffuse parenchymal volume loss and mild white matter  changes likely due to chronic microvascular ischemic disease    -permissive htn while in hospital    Plan at discharge:   - Will obtain an MRI of the brain- result pending. Had this am  -repeat a head CT at 24 hours post-TPA administration   - started high  dose Lipitor 40mg at bedtime with lipids in 6 weeks- per neuro needs lipitor for 6 weeks for Tx of inflammation from stroke, cardiology rec'd lipitor 40mg at bedtime for his moderate atherosclerotic dz of L ICA and 50% stenosis left vertebral artery. Consider lifelong statin Rx.   -pt initiated on Eliquis 5mg bid. (meets this dosing criteria per pharmD) for afib with cardioembolic stroke  - cardiology follow up  - Follow up [facility] in 6 weeks,   -see pcp next week,   - Lifelong anticoagulation.      Left carotid artery stenosis.    -As above, CTA of the head and neck showed 70% stenosis in the proximal left internal carotid artery and 50% stenosis at the origin of the vertebral artery.  Ongoing management per Neurology recommendations as above.  Anticipate initiation of a statin once safe for p.o. intake.  Blood pressure management as above. Lipids panel, total chol 162, LDL 94 goal <70, HDL 54.   -discussed with neurology and not thought significant and requiring statin but cardiology rec'd lipitor 40mg at bedtime.  Statin for embolic stroke inflammation per neuro for 3months.  Follow up cardiology and pcp  Lipid panel in 6 wekes          Atrial fibrillation,  -rate controlled.  Onset unclear.  He denies any history of chest pain, palpitations, dizziness or lightheadedness.  He is quite active and works out regularly.  He tells me he sometimes gets his heart rate up to 130s and then easily recovers to his baseline pulse in the 50s to 60s.   -slow ventricular response. On telemetry some asymptomatic pauses - less than 3 seconds overnight hours.  -TSH 1.6. nl K  - rate controlled.   -echo with Cardiomyopathy  -Pharm liason consulted for NOaC coverage- Eliquis covered. Given wt and Cr he is candidate for Eliquis 5mg bid.   -given slow ventricular response avoid BBers, CCBers     Cardiomyopathy  Routine echo showed:   There are regional wall motion abnormalities as specified.  The mid to distal  anterospetum and mid inferoseptum appears severely  hypokinetic to akinetic. The apex in some views appear at least mildly  hypokinetic. This is an unusual appearance as the anterior wall does not  appear hypokinetic and so the regional wall motion abnormalities are not  conforming to coronary territories.  The visual ejection fraction is estimated at 45-50%.  Left ventricular systolic function is borderline reduced.  The rhythm was atrial fibrillation.  There is no comparison study available. The study was technically difficult.     ekg showed afib with slow rate and septal Q wave. No acute ischemic changes  euvolemic on exam  No hx of CHF symptoms or anginal symptoms  Pt very active and no exercise intolerance  Pt seen by cardiology.   ? Stress cardiomyopathy-Takotsubo pattern vs ischemic CM   Plan:   Avoid bbers given slow Afib  Follow up in 4 weeks with Rosa Zaidi CNP with an echocardiogram prior to that visit. Continue medical management, consider nuclear stress test in the future for further risk stratification.    Low Na diet  Daily wts       Leukocytosis.   - On presentation, his white blood cell count was 21.4.  He is afebrile.  He notes recent symptoms of a cough, cold and congestion and just completed a Z-TITA and a course of prednisone with resolution of those symptoms.  Leukocytosis may well be reactive in the setting of recent steroids versus above.  He is otherwise afebrile.   Wbc 21--> 11.9 --> 11>9 day of discharge. Afebrile. No infectious sxs   Follow up with pcp      Borderline creatinine.    -His creatinine this evening is borderline elevated at 1.25 with estimated GFR of 52.  He has no prior history of renal dysfunction. Improved overnight to 1.1  Resolved. Cr 1.0 day of discharge.      Prediabetes  New dx. HbA1C 5.7% admission  Follow up pcp  Nutrition consult before discharge  Dm diet  F/u pcp     Deep venous thrombosis prophylaxis:    -PCDs on admission given he received TPA  Pt  ambulatory     CODE STATUS:  The patient is full code.      Dispo: discharge to home. No therapy needs      Mahesh Yancey MD, MD    Significant Results and Procedures   See hospital course    Pending Results   none  Unresulted Labs Ordered in the Past 30 Days of this Admission     No orders found from 9/7/2019 to 10/8/2019.          Code Status   Full Code       Primary Care Physician   [facility]    Physical Exam   Temp: 98  F (36.7  C) Temp src: Oral BP: (!) 142/84 Pulse: 55 Heart Rate: 59 Resp: 16 SpO2: 98 % O2 Device: None (Room air)    Vitals:    10/07/19 1641 10/08/19 0300   Weight: 74.8 kg (165 lb) 74.3 kg (163 lb 12.8 oz)     Vital Signs with Ranges  Temp:  [98  F (36.7  C)-98.9  F (37.2  C)] 98  F (36.7  C)  Pulse:  [54-74] 55  Heart Rate:  [50-80] 59  Resp:  [13-35] 16  BP: ()/(53-85) 142/84  SpO2:  [98 %-99 %] 98 %  I/O last 3 completed shifts:  In: 100 [P.O.:100]  Out: 600 [Urine:600]  Constitutional: Sitting up in  Bed, nad  HEENT: nl sclera,  Respiratory:     CTAB  Cardiovascular: irreg irreg rhythm.no r/g/m  GI: soft, nt,nd  Skin/Integumen:  No rash or edema  Psych: nl affect  Neuro: oriented fully, strength 5/5 extrem X 4, face symmetric, tongue midline, Nl speech and mentation, slight right facial droop/slight facial asymmetry      Discharge Disposition   Discharged to home  Condition at discharge: Good    Consultations This Hospital Stay   NEUROLOGY IP CONSULT  PHYSICAL THERAPY ADULT IP CONSULT  OCCUPATIONAL THERAPY ADULT IP CONSULT  SPEECH LANGUAGE PATH ADULT IP CONSULT  SWALLOW EVAL SPEECH PATH AT BEDSIDE IP CONSULT  SMOKING CESSATION PROGRAM IP CONSULT  PATIENT LEARNING CENTER IP CONSULT  PHARMACY LIAISON FOR MEDICATION COVERAGE CONSULT  NUTRITION SERVICES ADULT IP CONSULT  PHARMACY LIAISON FOR MEDICATION COVERAGE CONSULT  CARDIOLOGY IP CONSULT    Time Spent on this Encounter   I, Mahesh Yancey MD, personally saw the patient today and spent greater than 30  minutes discharging this patient.    Discharge Orders      Discharge Order: F/U with Cardiac  [facility]      Reason for your hospital stay    cardioembolic stroke from atrial fibrillation (Right sided weakness)     Activity    Your activity upon discharge: activity as tolerated     Follow-up and recommended labs and tests     1. Follow up with primary care provider next week      Scheduled Monday October 14 at 10:30 AM with    2. Follow up with [facility] of neurology 6 weeks       Appointment requested in [place], you should get a call (but can also call yourself: [phone])   3. Follow up with [facility] with echocardiogram in 4 weeks      Scheduled, see appointment section.     Discharge Instructions    1. Follow up appointments as specified  2. Start blood thinner Eliquis/Apixaban  3. No aspirin, ibuprofen, naproxen, advil (NSAID medications) while on Eliquis/Apixaban blood thinner as increases risk of bleeding     Full Code     Echocardiogram Complete    Administration of IV contrast will be tailored to this examination per the appropriate written protocol listed in the Echocardiography department Protocol Book, or by the supervising Cardiologist. This may result in an order change.    Use of contrast is at the discretion of the supervising Cardiologist.     Diet    Follow this diet upon discharge: Orders Placed This Encounter      Regular Diet Adult     Discharge Medications   Current Discharge Medication List      START taking these medications    Details   apixaban ANTICOAGULANT (ELIQUIS) 5 MG tablet Take 1 tablet (5 mg) by mouth 2 times daily  Qty: 60 tablet, Refills: 0    Comments: Future refills by PCP Dr. [facility] with phone number [phone].  Associated Diagnoses: Atrial fibrillation, new onset (H)      atorvastatin (LIPITOR) 40 MG tablet Take 1 tablet (40 mg) by mouth daily  Qty: 30 tablet, Refills: 0    Comments: Future refills by PCP Dr. [facility] with phone number [phone].  Associated Diagnoses: Cerebrovascular accident (CVA), unspecified mechanism (H)           Allergies   Allergies   Allergen Reactions     Penicillins      Data   Most Recent 3 CBC's:  Recent Labs   Lab Test 10/09/19  0701 10/08/19  0611 10/07/19  1644   WBC 11.9* 11.9* 21.4*   HGB 15.0 15.0 16.4   MCV 96 96 95    232 270      Most Recent 3 BMP's:  Recent Labs   Lab Test 10/09/19  0701 10/08/19  0611 10/07/19  1644    139 139   POTASSIUM 3.9 3.8 4.1   CHLORIDE 107 106 103   CO2 27 28 31   BUN 25 22 30   CR 1.08 1.12 1.25   ANIONGAP 4 5 5   CRUZ 8.5 8.6 9.3   GLC 91 90 98     Most Recent 2 LFT's:  Recent Labs   Lab Test 10/08/19  0611   AST 23   ALT 26   ALKPHOS 63   BILITOTAL 1.0     Most Recent INR's and Anticoagulation Dosing History:  Anticoagulation Dose History     Recent Dosing and Labs Latest Ref Rng & Units 10/7/2019    INR 0.86 - 1.14 1.03        Most Recent 3 Troponin's:  Recent Labs   Lab Test 10/08/19  0611 10/07/19  2221   TROPI 0.048* 0.053*     Most Recent Cholesterol Panel:  Recent Labs   Lab Test 10/08/19  0611   CHOL 162   LDL 94   HDL 54   TRIG 70     Most Recent 6 Bacteria Isolates From Any Culture (See EPIC Reports for Culture Details):No lab results found.  Most Recent TSH, T4 and A1c Labs:  Recent Labs   Lab Test 10/08/19  0611   TSH 1.66   A1C 5.7*     Results for orders placed or performed during the hospital encounter of 10/07/19   CT Head w/o Contrast    Narrative    CT SCAN OF THE HEAD WITHOUT CONTRAST   10/7/2019 4:58 PM     HISTORY: Right arm numbness and weakness starting at 1430. Code  stroke.    TECHNIQUE:  Axial images of the head and coronal reformations without  IV contrast material.  Radiation dose for this scan was reduced using  automated exposure control, adjustment of the mA and/or kV according  to patient size, or iterative reconstruction technique.    COMPARISON: None.    FINDINGS: There is some mild cerebral  atrophy. Some minimal  nonspecific white matter changes are present without mass effect.  There is no evidence for intracranial hemorrhage, mass effect, acute  infarct, or skull fracture. Visualized paranasal sinuses and mastoid  air cells are clear.      Impression    IMPRESSION: Chronic changes. No evidence for intracranial hemorrhage  or any acute process.    KORY GONZALEZ MD   CTA Head Neck with Contrast    Narrative    CTA  HEAD NECK WITH CONTRAST 10/7/2019 5:00 PM     HISTORY: Code stroke. Right arm numbness and weakness.    TECHNIQUE: Axial images were obtained through the head and neck with  intravenous contrast. 70 mL of Isovue-370 was given. Multiplanar  reconstructions were performed. 3-D reconstructions off a remote  workstation for CT angiography were also acquired. Carotid stenoses  were evaluated by comparing the caliber of the proximal internal  carotid artery to the caliber of the distal internal carotid artery.  Radiation dose for this scan was reduced using automated exposure  control, adjustment of the mA and/or kV according to patient size, or  iterative reconstruction technique..    FINDINGS:    Brachiocephalic vessels: Minimal calcific and noncalcified plaque. No  stenosis.    Right carotid system: Mild calcific plaque. No stenosis or dissection.    Left carotid system: Moderate calcified and noncalcified plaque  resulting in approximately 50% stenosis.    Right vertebral artery: Normal.    Left vertebral artery: Calcified plaque is seen near the origin. There  is approximately 50% stenosis. No dissection.    [place]: Normal.    Other findings: Some degenerative changes are seen in the spine.      Impression    IMPRESSION:  1. Moderate atherosclerotic disease with calcified and noncalcified  plaque in the proximal left internal carotid artery resulting in 70%  stenosis. This is best seen on the 3-D reformatted isolated left  siphon internal carotid artery images.  2. 50% stenosis at  the origin left vertebral artery.  3. No evidence for intracranial thromboembolism or aneurysm.    KORY GONZALEZ MD   CT Head Perfusion w Contrast    Narrative    CT HEAD PERFUSION WITH CONTRAST 10/7/2019 5:11 PM     HISTORY: Code stroke. Right arm numbness and weakness.    TECHNIQUE: Axial images were obtained through the brain with  intravenous contrast for CT brain perfusion imaging. Radiation dose  for this scan was reduced using automated exposure control, adjustment  of the mA and/or kV according to patient size, or iterative  reconstruction technique..    FINDINGS: Cerebral blood flow, cerebral blood volume, mean transit  time maps show some questionable altered perfusion in the left frontal  lobe. Exam is otherwise unremarkable.      Impression    IMPRESSION: Questionable altered perfusion in left frontal lobe on  perfusion imaging. If indicated, MRI might be helpful in further  evaluation.    KORY GONZALEZ MD   MRI Brain w & w/o contrast    Narrative    MRI BRAIN WITHOUT AND WITH CONTRAST  10/8/2019 8:48 AM     HISTORY: Stroke. Right-sided weakness and numbness.    TECHNIQUE: Multiplanar, multisequence MRI of the brain without and  with 7 mL Gadavist.     COMPARISON: Head CT 10/7/2019.     FINDINGS: Scattered areas of restricted diffusion within both cerebral  hemispheres involving the frontal and parietal regions. There is  cortical restricted diffusion in the left perirolandic region likely  accounting for the right-sided weakness and numbness. No  susceptibility hypointensity to suggest hemorrhagic transformation. T2  hyperintensity is seen within the areas of infarct. No significant  surrounding edema or mass effect. No midline shift or herniation.  Moderate diffuse parenchymal volume loss. Ventricular size is within  normal limits without evidence of hydrocephalus. Aside from the areas  of infarct, there is mild burden of patchy white matter T2  hyperintensities that are nonspecific, but likely related  to chronic  microvascular ischemic disease.    No abnormal intracranial enhancement.    The facial structures appear normal.       Impression    IMPRESSION:    1. Areas of infarct within bilateral cerebral hemispheres also  involving the left perirolandic region likely accounting for the  right-sided weakness/numbness. Given the bilateral infarcts, these are  favored to be embolic in nature. No significant mass effect or midline  shift. No evidence of hemorrhagic transformation.  2. Moderate diffuse parenchymal volume loss and mild white matter  changes likely due to chronic microvascular ischemic disease.        BERT DOAN MD

## 2019-10-09 NOTE — CONSULTS
CARDIOLOGY CONSULT    REASON FOR CONSULT:   afib with slow ventricular response, cardiomyopathy with wall motion abnormalities    PRIMARY CARE PHYSICIAN:  [facility]    HISTORY OF PRESENT ILLNESS:   Mr. Barraza is an 83 y/o gentleman without significant PMh who was admitted with new right sided weakness, numbness and tingling.  Code stroke was called and he was ultimately administered tPA.  MRI demonstrated scattered diffuse restrictions bihemispheric consistent with cardioembolic source.  ECG demonstrated atrial fibrillation with slow ventricular response.  He has Q waves anteriorly.  His echocardiogram demonstrated septal hypokinesis, EF 45-50%.   He was started on apixaban and atorvastatin.  He currently feels well.  He is an avid exerciser. He workouts daily including high intensity interval training.  He denies any exertional chest pain, chest discomfort, shortness of breath.  He denies any heart palpitations, light-headedness or dizziness.  He is entirely without cardiovascular complaints.        PAST MEDICAL HISTORY:  No prior history        MEDICATIONS:  Current Facility-Administered Medications   Medication     acetaminophen (TYLENOL) Suppository 650 mg     acetaminophen (TYLENOL) tablet 650 mg     apixaban ANTICOAGULANT (ELIQUIS) tablet 5 mg     atorvastatin (LIPITOR) tablet 20 mg     bisacodyl (DULCOLAX) Suppository 10 mg     influenza Vac Split High-Dose (FLUZONE) injection 0.5 mL     labetalol (NORMODYNE/TRANDATE) injection 10-20 mg     lidocaine (LMX4) cream     lidocaine 1 % 0.1-1 mL     melatonin tablet 1 mg     naloxone (NARCAN) injection 0.1-0.4 mg     NO anticoagulation/antiplatelet medications or NSAID in first 24 hours after alteplase (ACTIVASE) administration or after thrombectomy     ondansetron (ZOFRAN-ODT) ODT tab 4 mg    Or     ondansetron (ZOFRAN) injection 4 mg     polyethylene glycol (MIRALAX/GLYCOLAX) Packet 17 g     prochlorperazine (COMPAZINE) injection 5 mg    Or      prochlorperazine (COMPAZINE) tablet 5 mg    Or     prochlorperazine (COMPAZINE) Suppository 12.5 mg     senna-docusate (SENOKOT-S/PERICOLACE) 8.6-50 MG per tablet 1 tablet    Or     senna-docusate (SENOKOT-S/PERICOLACE) 8.6-50 MG per tablet 2 tablet     sodium chloride (PF) 0.9% PF flush 10 mL     sodium chloride (PF) 0.9% PF flush 3 mL     sodium chloride (PF) 0.9% PF flush 3 mL       ALLERGIES:  Allergies   Allergen Reactions     Penicillins        SOCIAL HISTORY:  I have reviewed this patient's social history and updated it with pertinent information if needed. Tu Barraza  reports that he has never smoked. He has never used smokeless tobacco. He reports current alcohol use. He reports that he does not use drugs.    FAMILY HISTORY:  I have reviewed this patient's family history and updated it with pertinent information if needed.   Family History   Problem Relation Age of Onset     Cancer - colorectal Father        REVIEW OF SYSTEMS:  A complete ROS was obtained and the pertinent positives are outlined in the history of present illness above.  The remainder of systems is negative.      PHYSICAL EXAM:  Temp: 98  F (36.7  C) Temp src: Oral BP: (!) 142/84 Pulse: 55 Heart Rate: 59 Resp: 16 SpO2: 98 % O2 Device: None (Room air)    Vital Signs with Ranges  Temp:  [98  F (36.7  C)-98.9  F (37.2  C)] 98  F (36.7  C)  Pulse:  [54-74] 55  Heart Rate:  [50-80] 59  Resp:  [11-35] 16  BP: ()/(53-87) 142/84  SpO2:  [97 %-99 %] 98 %  163 lbs 12.83 oz    Constitutional: awake, alert, no distress  Eyes: PERRL, sclera nonicteric  ENT: trachea midline  Respiratory:  CTAB  Cardiovascular: Irregularly irregular, no JVD  GI: nondistended, nontender, bowel sounds present  Lymph/Hematologic: no lymphadenopathy  Skin: dry, no rash  Musculoskeletal: good muscle tone, no edema bilaterally  Neurologic: no focal deficits  Neuropsychiatric: appropriate affact    DATA:  Reviewed in Epic    Telemetry:  Reviewed.  Afib with slow VR,  some pauses (less than 3 seconds overnight hours)           ASSESSMENT:  1.   CVA s/p tPA; multiple foci on MRI consistent with cardioembolic origin  2.   Atrial fibrillation with slow ventricular response.  Asymptomatic.  Reviewed pathophysiology of afib and management.  Patient is entirely asymptomatic-would recommend continuing with medical management.  No indication for pacemaker at this time.    3.   Moderate carotid artery disease  4.  Cardiomyopathy:  With anteroseptal/inferoseptal regional wall motion abnormalities and ECG suggestive of prior infarction (versus variant stress cardiomyopathy), entirely asymptomatic.        RECOMMENDATIONS:  1. Agree with apixaban 5 mg PO BID and atorvastatin 40 mg daily  2.  Avoid beta-blockers with afib with slow ventricular response.  3.  Follow up in 4 weeks with Rosa Zaidi CNP with an echocardiogram prior to that visit. Continue medical management, consider nuclear stress test in the future for further risk stratification.      Olga Arndt MD  Cardiology - [facility]  Pager:  [phone]  October 9, 2019

## 2019-10-09 NOTE — PLAN OF CARE
Pt here with bilateral scattered strokes. A&Ox4. Neuros with RUE numbness, Slight R facial droop. NIH 2. VSS on RA. Tele A fib SVR at rate of 56, HR occasionally dipping into 30s, pauses occurring longest 2.9 seconds- MD aware. regular diet, thin liquids. Takes pills whole with water. Eliquis started- education given. Up SBA. Denies pain. Pt scoring green on the Aggression Stop Light Tool. Plan for discharge home today.

## 2019-10-09 NOTE — CONSULTS
Care Coordination:    Bedside RN requested CTS-RN review of discharge appointments.     Pt DOES have Cards appointment already scheduled on AVS:    Nov 14, 2019 11:00 AM   Echo Complete with [id number]  [facility] CV Echocardiography    Nov 14, 2019  2:30 PM   Return Discharge with LIZZIE Dorado CNP  [facility]    Dec 10, 2019  1:15 PM   Return Visit with Olga Arndt MD  [facility]      Neurology appointment recommended in 4-6 weeks but not yet scheduled.  Appointment requested with [place] .  Info for all [facility] clinics added to AVS.  PCP scheduled as ordered.      Added info to appointment orders:      1. Follow up with primary care provider next week       Scheduled Monday October 14 at 10:30 AM with    2. Follow up with [facility] of neurology 6 weeks        Appointment requested in [place], you should get a call (but can also call yourself: [phone])   3. Follow up with [facility] with echocardiogram in 4 weeks       Scheduled, see appointment section.     Yelitza Franco RN, BSN  [facility] Care Coordinator   Mobile Phone: [phone]

## 2019-10-09 NOTE — CONSULTS
"BRIEF NUTRITION ASSESSMENT    REASON FOR ASSESSMENT:  Tu Barraza is a 84 year old male seen by Registered Dietitian for Provider Order - \"new diagnosis of Prediabetes\"    NUTRITION HISTORY:  - patient noted that his A1c has been on \"the high side of normal\" for some time now.   - He and his spouse already limit their starchy carb intake (no bread, minimal rice/pasta, no white potatoes). They do eat sweet potatoes on occasion. Spouse does most of the cooking.   - They also exercise at [employer] ~4x weekly.   - Pt's spouse mentions a strong family hx of diabetes in Tu's family.     CURRENT DIET AND INTAKE:  Diet:  Regular              Appetite is normal.     ANTHROPOMETRICS:  Height: 5' 9\"  Weight:  163 lbs 12.83 oz (74.3 kg)   Body mass index is 24.19 kg/m .   Weight Status: Normal BMI  IBW:  72.7 kg  %IBW: 102%  Weight History:   Wt Readings from Last 10 Encounters:   10/08/19 74.3 kg (163 lb 12.8 oz)   04/30/14 80.7 kg (178 lb)   11/15/06 81.6 kg (180 lb)       LABS/MEDS/PHYSICAL FINDINGS:  Reviewed.     Lab Results   Component Value Date    A1C 5.7 10/08/2019    A1C 5.3 12/12/2011     Recent Labs   Lab 10/09/19  0701 10/08/19  0611 10/07/19  2004 10/07/19  1644   GLC 91 90  --  98   BGM  --   --  86  --          MALNUTRITION:  Patient does not meet two of the following criteria necessary for diagnosing malnutrition: significant weight loss, reduced intake, subcutaneous fat loss, muscle loss or fluid retention. Nutrition Focused Physical Assessment (NFPA) not appropriate at this time.    NUTRITION INTERVENTION:  Nutrition Diagnosis:  No nutrition diagnosis at this time.    Implementation:  Nutrition Education:  Praised pt and spouse on continued healthy lifestyle, exercise, and diet choices. Encouraged pt to continue his healthy habits.   Follow up with PCP    FOLLOW UP/MONITORING:   Will re-evaluate in 7 - 10 days, or sooner, if re-consulted.      Louisa Luciano RD, LD  [facility] [place], 66, 55, [facility] "   Pager: [phone]  Weekend Pager: [phone]

## 2019-10-09 NOTE — PROVIDER NOTIFICATION
MD Notification    Notified Person: MD    Notified Person Name: Mir MD    Notification Date/Time: 0525 10/9/19    Notification Interaction: page    Purpose of Notification: FYI: Pt in A fib SVR. HR intermittently in 30s. pauses occurring. longest pause 2.9 seconds.    Orders Received: continue to monitor    Comments:

## 2019-10-09 NOTE — PLAN OF CARE
Discharge Planner SLP   Patient plan for discharge: Home today  Current status: Swallow Tx was provided this am.  Pt reports no difficulty with breakfast this am.  Slight improved with right oral motor ROM noted during today's session.  Pt tolerated regular solids and thin liquids without signs of aspiration given min cues to use strategies.  Goal has been met.  No speech-language difficulty noted in brief conversation and pt feels his function is at baseline.  No speech-language eval indicated at this time.  Barriers to return to prior living situation: No SLP barriers  Recommendations for discharge: Home  Rationale for recommendations: Swallow Tx goal has been met.  No current speech-language issues noted/reported.       Entered by: Narcisa Caballero 10/09/2019 9:04 AM      Speech Language Therapy Discharge Summary    Reason for therapy discharge:    All goals and outcomes met, no further needs identified.    Progress towards therapy goal(s). See goals on Care Plan in [facility] electronic health record for goal details.  Goals met    Therapy recommendation(s):    No further therapy is recommended.  PRN OP SLP eval and Tx if speech-language/high level cognitive difficulty noted after discharge home.  Pt verbalized understanding of education for PRN OP follow up.

## 2019-10-09 NOTE — DISCHARGE INSTRUCTIONS
Call asap to schedule a neurology follow-up (appointment 4 to 6 weeks from discharge). Tell them you were recently hospitalized and need follow up as recommended at discharge.  (Appointment request made for [place] location)     [facility]   [address], [address]    [place] [address]   [phone]       Your risk factors for stroke or TIA (transient ischemic attack):    Your Risk Factors Your Results Normal Ranges   High blood pressure BP Readings from Last 1 Encounters:   10/09/19 (!) 142/84    Less than 120/80   Cholesterol              Total Lab Results   Component Value Date    CHOL 162 10/08/2019      Less than 150    Triglycerides   Lab Results   Component Value Date    TRIG 70 10/08/2019    Less than 150   LDL Lab Results   Component Value Date    LDL 94 10/08/2019       Less than 70   HDL Lab Results   Component Value Date    HDL 54 10/08/2019            Greater than 40 (men)  Greater than 50 (women)   Diabetes Recent Labs   Lab 10/09/19  0701   GLC 91    Fasting blood glucose    Smoking/tobacco use  Quit smoking and tobacco   Overweight  Lose 1-2 pounds a week   Lack of exercise  30 minutes moderate activity each day   Other risk factors include carotid (neck) artery disease, atrial fibrillation and stress. You may be on new medicine to treat high blood pressure, cholesterol, diabetes or atrial fibrillation.    Understanding Stroke Booklet given to patient. Please refer to booklet for further information.    Stroke warning signs and symptoms - CALL 911 right away for:  - Sudden numbness or weakness in the face, arm or leg (often on one side of the body).  - Sudden confusion or trouble understanding what is going on.  - Sudden blurred or decreased vision in one or both eyes.  - Sudden trouble speaking, loss of balance, dizziness or problems with coordination.  - Sudden, severe headache for no reason.  - Fainting or seizures.  - Symptoms may go away then come back  suddenly.

## 2019-10-10 ENCOUNTER — TELEPHONE (OUTPATIENT)
Dept: CARDIOLOGY | Facility: CLINIC | Age: 84
End: 2019-10-10

## 2019-10-10 LAB — INTERPRETATION ECG - MUSE: NORMAL

## 2019-10-10 NOTE — TELEPHONE ENCOUNTER
Patient was evaluated by cardiology while inpatient for new onset A. Fib with slow ventricular response, right sided weakness and N/T-code stroke called and pt received tPA. CVA believed to be cardio embolic. Pt was started on Lipitor and Eliquis. No BB secondary to slow ventricular response. Called patient to discuss any post hospital d/c questions he may have, review medication changes, and confirm f/u appts. Patient denied any questions regarding new medications or changes to PTA medications. Patient denied any SOB, chest pain, or light headedness. RN confirmed with patient that he has an apt scheduled on 11/14/19 with TAMIKA Freida Tinamaya, with echo prior. Patient advised to call clinic with any cardiac related questions or concerns prior to this tamika't. Patient verbalized understanding and agreed with plan. SUMEET Gilbert RN.

## 2019-10-29 ENCOUNTER — HEALTH MAINTENANCE LETTER (OUTPATIENT)
Age: 84
End: 2019-10-29

## 2019-11-14 ENCOUNTER — HOSPITAL ENCOUNTER (OUTPATIENT)
Dept: CARDIOLOGY | Facility: CLINIC | Age: 84
Discharge: HOME | End: 2019-11-14
Attending: FAMILY MEDICINE | Admitting: FAMILY MEDICINE
Payer: COMMERCIAL

## 2019-11-14 ENCOUNTER — OFFICE VISIT (OUTPATIENT)
Dept: CARDIOLOGY | Facility: CLINIC | Age: 84
End: 2019-11-14
Attending: FAMILY MEDICINE
Payer: COMMERCIAL

## 2019-11-14 VITALS
BODY MASS INDEX: 26.66 KG/M2 | SYSTOLIC BLOOD PRESSURE: 129 MMHG | HEIGHT: 69 IN | HEART RATE: 62 BPM | WEIGHT: 180 LBS | OXYGEN SATURATION: 98 % | DIASTOLIC BLOOD PRESSURE: 75 MMHG

## 2019-11-14 DIAGNOSIS — R93.89 ABNORMAL VENTRICULAR WALL MOTION: ICD-10-CM

## 2019-11-14 DIAGNOSIS — I48.91 ATRIAL FIBRILLATION, NEW ONSET (H): ICD-10-CM

## 2019-11-14 PROCEDURE — 93000 ELECTROCARDIOGRAM COMPLETE: CPT | Performed by: NURSE PRACTITIONER

## 2019-11-14 PROCEDURE — 93306 TTE W/DOPPLER COMPLETE: CPT

## 2019-11-14 PROCEDURE — 99214 OFFICE O/P EST MOD 30 MIN: CPT | Mod: 25 | Performed by: NURSE PRACTITIONER

## 2019-11-14 PROCEDURE — 93306 TTE W/DOPPLER COMPLETE: CPT | Mod: 26 | Performed by: INTERNAL MEDICINE

## 2019-11-14 RX ORDER — MULTIPLE VITAMINS W/ MINERALS TAB 9MG-400MCG
1 TAB ORAL DAILY
COMMUNITY

## 2019-11-14 RX ORDER — ASPIRIN 81 MG/1
81 TABLET ORAL DAILY
COMMUNITY
Start: 2019-11-14 | End: 2020-01-08

## 2019-11-14 ASSESSMENT — MIFFLIN-ST. JEOR: SCORE: 1496.85

## 2019-11-14 NOTE — PROGRESS NOTES
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 He is a pleasant 84 year old patient of Dr. Arndt.    Mr. Barraza is a healthy 84-year-old without a significant past medical history, in fact he rarely goes to doctors.  He was last seen for 5 years ago when he had cataract surgery in his preop EKG at that time was reportedly normal.  He exercises regularly, and in early October he and his wife are exercising, he then went in the hot tub and did not feel quite right.  When he got home he felt worse and ultimately went to the ED where he was seen for right-sided weakness, numbing and tingling, code stroke was called and he received TPA. MRI demonstrated scattered diffuse restrictions bihemispheric consistent with cardioembolic source.  ECG demonstrated atrial fibrillation with slow ventricular response.  He has Q waves anteriorly.  His echocardiogram demonstrated septal hypokinesis, EF 45-50%.   He was started on apixaban and atorvastatin.  He had a CT head and neck which demonstrated moderate atherosclerotic disease with calcified and noncalcified plaques in the proximal left internal carotid artery resulting in 70% stenosis.  He had a 50% stenosis of the origin of the left vertebral artery.  His TSH was normal.  He is not a snorer.  He does not have hypertension, in fact by my check today off of antihypertensives his blood pressure is 110/65.  He was not put on beta-blockers because of his bradycardia.  He did have anterior Q waves    He had echocardiogram done before the visit today, this was unchanged from a month ago.  His EF was 50%, there is mild distal anterior septal and apical hypokinesis and mild TR with RVSP of 41+ right atrial pressure.    Today he feels well.  He denies any chest pain, shortness of breath, PND orthopnea.  Specifically he does not get short of breath with regular  activity, only with exercise and sometimes if he walks up a steep hill.  He had been on a low-dose aspirin that he put himself on, but stopped it this summer based on news articles.  He has follow-up with neurology tomorrow.    Labs Reviewed: Cholesterol 162, HDL 54, LDL 94, creatinine 1.08.  EKG shows a atrial fibrillation with a rate of 61 bpm.    Physical Exam  Please see Below     Assessment and Plan  1.  New atrial fibrillation.  It is hard to say when the onset was, the only record of the EKG I could find was in care everywhere going back a few years ago when he had a normal EKG prior to cataract surgery.  His rate appears controlled but we have not done a monitor, I will have him wear a Holter monitor before follow-up with Dr. Abarca.  He is on anticoagulation, Eliquis 5 mg twice daily.  He is not on a beta-blocker because of bradycardia.  His TSH was normal.  He does not snore.  He is very active.  2.  Moderate carotid artery disease.  His proximal left internal carotid artery had a 70% stenosis.  He was started on statin therapy.  I recommend he start a low-dose aspirin today.  He has follow-up tomorrow with neurology.  3. Cardiomyopathy:  Stable with anteroseptal/inferoseptal regional wall motion abnormalities and ECG suggestive of prior infarction (versus variant stress cardiomyopathy), entirely asymptomatic. He is not in heart failure.    Thank you for allowing me to care for Tu Barraza today.    LIZZIE Boone, CNP  Cardiology    Voice recognition software was used for this note, I have reviewed this note, but errors may have been missed.    Orders Placed This Encounter   Procedures     EKG 12-lead complete w/read - Clinics (performed today)     Holter Monitor 24 hour Adult Pediatric     Orders Placed This Encounter   Medications     multivitamin w/minerals (MULTI-VITAMIN) tablet     Sig: Take 1 tablet by mouth daily     FIBER PO     Omega-3 Fatty Acids (FISH OIL PO)     aspirin 81 MG EC  tablet     Sig: Take 1 tablet (81 mg) by mouth daily     There are no discontinued medications.      CURRENT MEDICATIONS:  Current Outpatient Medications   Medication Sig Dispense Refill     apixaban ANTICOAGULANT (ELIQUIS) 5 MG tablet Take 1 tablet (5 mg) by mouth 2 times daily 60 tablet 0     aspirin 81 MG EC tablet Take 1 tablet (81 mg) by mouth daily       atorvastatin (LIPITOR) 40 MG tablet Take 1 tablet (40 mg) by mouth daily 30 tablet 0     FIBER PO        multivitamin w/minerals (MULTI-VITAMIN) tablet Take 1 tablet by mouth daily       Omega-3 Fatty Acids (FISH OIL PO)          ALLERGIES     Allergies   Allergen Reactions     Penicillins        PAST MEDICAL HISTORY:  Past Medical History:   Diagnosis Date     Atrial fibrillation, new onset (H) 10/8/2019     Cerebral infarction (H)      Uncomplicated asthma        PAST SURGICAL HISTORY:  Past Surgical History:   Procedure Laterality Date     COLONOSCOPY  4/30/2014    Procedure: COMBINED COLONOSCOPY, SINGLE BIOPSY/POLYPECTOMY BY BIOPSY;  Surgeon: Eros Licona MD;  Location:  GI       FAMILY HISTORY:  Family History   Problem Relation Age of Onset     Cancer - colorectal Father        SOCIAL HISTORY:  Social History     Socioeconomic History     Marital status:      Spouse name: None     Number of children: None     Years of education: None     Highest education level: None   Occupational History     None   Social Needs     Financial resource strain: None     Food insecurity:     Worry: None     Inability: None     Transportation needs:     Medical: None     Non-medical: None   Tobacco Use     Smoking status: Never Smoker     Smokeless tobacco: Never Used   Substance and Sexual Activity     Alcohol use: Yes     Comment: 3-4 times a week     Drug use: No     Sexual activity: None   Lifestyle     Physical activity:     Days per week: None     Minutes per session: None     Stress: None   Relationships     Social connections:     Talks on phone: None  "    Gets together: None     Attends Anabaptist service: None     Active member of club or organization: None     Attends meetings of clubs or organizations: None     Relationship status: None     Intimate partner violence:     Fear of current or ex partner: None     Emotionally abused: None     Physically abused: None     Forced sexual activity: None   Other Topics Concern     Parent/sibling w/ CABG, MI or angioplasty before 65F 55M? Not Asked   Social History Narrative     None       Review of Systems:  Skin:  Negative       Eyes:  Negative      ENT:  Negative      Respiratory:  Positive for shortness of breath;wheezing;cough     Cardiovascular:  Negative      Gastroenterology: Negative      Genitourinary:  Positive for urinary frequency    Musculoskeletal:  Negative      Neurologic:  Negative      Psychiatric:  Negative      Heme/Lymph/Imm:  Negative      Endocrine:  Negative        Physical Exam:  Vitals: /75   Pulse 62   Ht 1.753 m (5' 9\")   Wt 81.6 kg (180 lb)   SpO2 98%   BMI 26.58 kg/m      Constitutional:  cooperative        Skin:  warm and dry to the touch          Head:  normocephalic        Eyes:  pupils equal and round        Lymph:      ENT:           Neck:  JVP normal   faint L carotid bruit    Respiratory:  clear to auscultation         Cardiac:   irregularly irregular rhythm;bradycardic              pulses full and equal                                        GI:  not assessed this visit        Extremities and Muscular Skeletal:  no edema              Neurological:  no gross motor deficits        Psych:  Alert and Oriented x 3    Encounter Diagnoses   Name Primary?     Atrial fibrillation, new onset (H)      Abnormal ventricular wall motion        Recent Lab Results:  LIPID RESULTS:  Lab Results   Component Value Date    CHOL 162 10/08/2019    HDL 54 10/08/2019    LDL 94 10/08/2019    TRIG 70 10/08/2019       LIVER ENZYME RESULTS:  Lab Results   Component Value Date    AST 23 10/08/2019    " ALT 26 10/08/2019       CBC RESULTS:  Lab Results   Component Value Date    WBC 11.9 (H) 10/09/2019    RBC 4.71 10/09/2019    HGB 15.0 10/09/2019    HCT 45.2 10/09/2019    MCV 96 10/09/2019    MCH 31.8 10/09/2019    MCHC 33.2 10/09/2019    RDW 13.1 10/09/2019     10/09/2019       BMP RESULTS:  Lab Results   Component Value Date     10/09/2019    POTASSIUM 3.9 10/09/2019    CHLORIDE 107 10/09/2019    CO2 27 10/09/2019    ANIONGAP 4 10/09/2019    GLC 91 10/09/2019    BUN 25 10/09/2019    CR 1.08 10/09/2019    GFRESTIMATED 63 10/09/2019    GFRESTBLACK 73 10/09/2019    CRUZ 8.5 10/09/2019        A1C RESULTS:  Lab Results   Component Value Date    A1C 5.7 (H) 10/08/2019       INR RESULTS:  Lab Results   Component Value Date    INR 1.03 10/07/2019           CC  Freida Zaidi APRN CNP  [id number]  [place] [address]

## 2019-11-14 NOTE — LETTER
11/14/2019    El Mejia MD  [employer] [address]  [facility] [address]    RE: Tu Barraza       Dear Colleague,    I had the pleasure of seeing Tu Barraza in the [facility].    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 He is a pleasant 84 year old patient of Dr. Arndt.    Mr. Barraza is a healthy 84-year-old without a significant past medical history, in fact he rarely goes to doctors.  He was last seen for 5 years ago when he had cataract surgery in his preop EKG at that time was reportedly normal.  He exercises regularly, and in early October he and his wife are exercising, he then went in the hot tub and did not feel quite right.  When he got home he felt worse and ultimately went to the ED where he was seen for right-sided weakness, numbing and tingling, code stroke was called and he received TPA. MRI demonstrated scattered diffuse restrictions bihemispheric consistent with cardioembolic source.  ECG demonstrated atrial fibrillation with slow ventricular response.  He has Q waves anteriorly.  His echocardiogram demonstrated septal hypokinesis, EF 45-50%.   He was started on apixaban and atorvastatin.  He had a CT head and neck which demonstrated moderate atherosclerotic disease with calcified and noncalcified plaques in the proximal left internal carotid artery resulting in 70% stenosis.  He had a 50% stenosis of the origin of the left vertebral artery.  His TSH was normal.  He is not a snorer.  He does not have hypertension, in fact by my check today off of antihypertensives his blood pressure is 110/65.  He was not put on beta-blockers because of his bradycardia.  He did have anterior Q waves    He had echocardiogram done before the visit today, this was unchanged from a month ago.  His EF was 50%, there is mild distal anterior septal  and apical hypokinesis and mild TR with RVSP of 41+ right atrial pressure.    Today he feels well.  He denies any chest pain, shortness of breath, PND orthopnea.  Specifically he does not get short of breath with regular activity, only with exercise and sometimes if he walks up a steep hill.  He had been on a low-dose aspirin that he put himself on, but stopped it this summer based on news articles.  He has follow-up with neurology tomorrow.    Labs Reviewed: Cholesterol 162, HDL 54, LDL 94, creatinine 1.08.  EKG shows a atrial fibrillation with a rate of 61 bpm.    Physical Exam  Please see Below     Assessment and Plan  1.  New atrial fibrillation.  It is hard to say when the onset was, the only record of the EKG I could find was in care everywhere going back a few years ago when he had a normal EKG prior to cataract surgery.  His rate appears controlled but we have not done a monitor, I will have him wear a Holter monitor before follow-up with Dr. Abarca.  He is on anticoagulation, Eliquis 5 mg twice daily.  He is not on a beta-blocker because of bradycardia.  His TSH was normal.  He does not snore.  He is very active.  2.  Moderate carotid artery disease.  His proximal left internal carotid artery had a 70% stenosis.  He was started on statin therapy.  I recommend he start a low-dose aspirin today.  He has follow-up tomorrow with neurology.  3. Cardiomyopathy:   Stable with anteroseptal/inferoseptal regional wall motion abnormalities and ECG suggestive of prior infarction (versus variant stress cardiomyopathy), entirely asymptomatic. He is not in heart failure.    Thank you for allowing me to care for Tu Barraza today.    LIZZIE Boone, CNP  Cardiology    Voice recognition software was used for this note, I have reviewed this note, but errors may have been missed.    Orders Placed This Encounter   Procedures     EKG 12-lead complete w/read - Clinics (performed today)     Holter Monitor 24 hour Adult  Pediatric     Orders Placed This Encounter   Medications     multivitamin w/minerals (MULTI-VITAMIN) tablet     Sig: Take 1 tablet by mouth daily     FIBER PO     Omega-3 Fatty Acids (FISH OIL PO)     aspirin 81 MG EC tablet     Sig: Take 1 tablet (81 mg) by mouth daily     There are no discontinued medications.      CURRENT MEDICATIONS:  Current Outpatient Medications   Medication Sig Dispense Refill     apixaban ANTICOAGULANT (ELIQUIS) 5 MG tablet Take 1 tablet (5 mg) by mouth 2 times daily 60 tablet 0     aspirin 81 MG EC tablet Take 1 tablet (81 mg) by mouth daily       atorvastatin (LIPITOR) 40 MG tablet Take 1 tablet (40 mg) by mouth daily 30 tablet 0     FIBER PO        multivitamin w/minerals (MULTI-VITAMIN) tablet Take 1 tablet by mouth daily       Omega-3 Fatty Acids (FISH OIL PO)          ALLERGIES     Allergies   Allergen Reactions     Penicillins        PAST MEDICAL HISTORY:  Past Medical History:   Diagnosis Date     Atrial fibrillation, new onset (H) 10/8/2019     Cerebral infarction (H)      Uncomplicated asthma        PAST SURGICAL HISTORY:  Past Surgical History:   Procedure Laterality Date     COLONOSCOPY  4/30/2014    Procedure: COMBINED COLONOSCOPY, SINGLE BIOPSY/POLYPECTOMY BY BIOPSY;  Surgeon: Eros Licona MD;  Location:  GI       FAMILY HISTORY:  Family History   Problem Relation Age of Onset     Cancer - colorectal Father        SOCIAL HISTORY:  Social History     Socioeconomic History     Marital status:      Spouse name: None     Number of children: None     Years of education: None     Highest education level: None   Occupational History     None   Social Needs     Financial resource strain: None     Food insecurity:     Worry: None     Inability: None     Transportation needs:     Medical: None     Non-medical: None   Tobacco Use     Smoking status: Never Smoker     Smokeless tobacco: Never Used   Substance and Sexual Activity     Alcohol use: Yes     Comment: 3-4 times a  "week     Drug use: No     Sexual activity: None   Lifestyle     Physical activity:     Days per week: None     Minutes per session: None     Stress: None   Relationships     Social connections:     Talks on phone: None     Gets together: None     Attends [employer] service: None     Active member of club or organization: None     Attends meetings of clubs or organizations: None     Relationship status: None     Intimate partner violence:     Fear of current or ex partner: None     Emotionally abused: None     Physically abused: None     Forced sexual activity: None   Other Topics Concern     Parent/sibling w/ CABG, MI or angioplasty before 65F 55M? Not Asked   Social History Narrative     None       Review of Systems:  Skin:  Negative       Eyes:  Negative      ENT:  Negative      Respiratory:  Positive for shortness of breath;wheezing;cough     Cardiovascular:  Negative      Gastroenterology: Negative      Genitourinary:  Positive for urinary frequency    Musculoskeletal:  Negative      Neurologic:  Negative      Psychiatric:  Negative      Heme/Lymph/Imm:  Negative      Endocrine:  Negative        Physical Exam:  Vitals: /75   Pulse 62   Ht 1.753 m (5' 9\")   Wt 81.6 kg (180 lb)   SpO2 98%   BMI 26.58 kg/m       Constitutional:  cooperative        Skin:  warm and dry to the touch          Head:  normocephalic        Eyes:  pupils equal and round        Lymph:      ENT:           Neck:  JVP normal   faint L carotid bruit    Respiratory:  clear to auscultation         Cardiac:   irregularly irregular rhythm;bradycardic              pulses full and equal                                        GI:  not assessed this visit        Extremities and Muscular Skeletal:  no edema              Neurological:  no gross motor deficits        Psych:  Alert and Oriented x 3    Encounter Diagnoses   Name Primary?     Atrial fibrillation, new onset (H)      Abnormal ventricular wall motion        Recent Lab Results:  LIPID " RESULTS:  Lab Results   Component Value Date    CHOL 162 10/08/2019    HDL 54 10/08/2019    LDL 94 10/08/2019    TRIG 70 10/08/2019       LIVER ENZYME RESULTS:  Lab Results   Component Value Date    AST 23 10/08/2019    ALT 26 10/08/2019       CBC RESULTS:  Lab Results   Component Value Date    WBC 11.9 (H) 10/09/2019    RBC 4.71 10/09/2019    HGB 15.0 10/09/2019    HCT 45.2 10/09/2019    MCV 96 10/09/2019    MCH 31.8 10/09/2019    MCHC 33.2 10/09/2019    RDW 13.1 10/09/2019     10/09/2019       BMP RESULTS:  Lab Results   Component Value Date     10/09/2019    POTASSIUM 3.9 10/09/2019    CHLORIDE 107 10/09/2019    CO2 27 10/09/2019    ANIONGAP 4 10/09/2019    GLC 91 10/09/2019    BUN 25 10/09/2019    CR 1.08 10/09/2019    GFRESTIMATED 63 10/09/2019    GFRESTBLACK 73 10/09/2019    CRUZ 8.5 10/09/2019        A1C RESULTS:  Lab Results   Component Value Date    A1C 5.7 (H) 10/08/2019       INR RESULTS:  Lab Results   Component Value Date    INR 1.03 10/07/2019         Thank you for allowing me to participate in the care of your patient.    Sincerely,     LIZZIE Juares [facility]

## 2019-11-15 ENCOUNTER — TELEPHONE (OUTPATIENT)
Dept: OTHER | Facility: CLINIC | Age: 84
End: 2019-11-15

## 2019-11-15 NOTE — TELEPHONE ENCOUNTER
Referral received via fax 11/15/19.      Pt referred to [facility] by Dr. Mckeon for left carotid stenosis.  Pt was admitted on 10/9/19 for cardioembolic strokes with right sided weakness/R facial droop, s/p TPA with resolution.    10/7/19 CTA head/neck with contrast available in Epic, reporting 70% stenosis in proximal left internal carotid artery.     Pt needs to be scheduled for consult with Dr. Ferguson (per referring).  Will route to scheduling to coordinate an appointment ASAP.    JEB Huerta RN

## 2019-11-15 NOTE — TELEPHONE ENCOUNTER
Spoke with patient regarding referral received by Dr. Mckeon 11/15/2019.    Patient stated that he is scheduled to meet with a cardiovascular provider on 12/10/2019 upon returning from a trip that he is leaving for shortly.     Once he meets with the cardiovascular provider, he will then have more information and be able to formulate a plan with how he would like to proceed in regards to this referral. He is currently declining this referral at this time.     [facility]  Appointment

## 2019-11-18 ENCOUNTER — HOSPITAL ENCOUNTER (OUTPATIENT)
Dept: CARDIOLOGY | Facility: CLINIC | Age: 84
Discharge: HOME | End: 2019-11-18
Attending: NURSE PRACTITIONER | Admitting: NURSE PRACTITIONER
Payer: COMMERCIAL

## 2019-11-18 DIAGNOSIS — I48.91 ATRIAL FIBRILLATION, NEW ONSET (H): ICD-10-CM

## 2019-11-18 PROCEDURE — 93225 XTRNL ECG REC<48 HRS REC: CPT

## 2019-11-18 PROCEDURE — 93227 XTRNL ECG REC<48 HR R&I: CPT | Performed by: INTERNAL MEDICINE

## 2019-12-10 ENCOUNTER — OFFICE VISIT (OUTPATIENT)
Dept: CARDIOLOGY | Facility: CLINIC | Age: 84
End: 2019-12-10
Payer: COMMERCIAL

## 2019-12-10 ENCOUNTER — TELEPHONE (OUTPATIENT)
Dept: OTHER | Facility: CLINIC | Age: 84
End: 2019-12-10

## 2019-12-10 VITALS
DIASTOLIC BLOOD PRESSURE: 82 MMHG | SYSTOLIC BLOOD PRESSURE: 137 MMHG | WEIGHT: 177 LBS | OXYGEN SATURATION: 99 % | BODY MASS INDEX: 26.22 KG/M2 | HEIGHT: 69 IN | HEART RATE: 66 BPM

## 2019-12-10 DIAGNOSIS — I48.21 PERMANENT ATRIAL FIBRILLATION (H): ICD-10-CM

## 2019-12-10 DIAGNOSIS — E78.5 HYPERLIPIDEMIA LDL GOAL <100: ICD-10-CM

## 2019-12-10 DIAGNOSIS — I63.9 CEREBROVASCULAR ACCIDENT (CVA), UNSPECIFIED MECHANISM (H): ICD-10-CM

## 2019-12-10 DIAGNOSIS — I77.9 RIGHT-SIDED CAROTID ARTERY DISEASE, UNSPECIFIED TYPE (H): Primary | ICD-10-CM

## 2019-12-10 PROCEDURE — 99214 OFFICE O/P EST MOD 30 MIN: CPT | Performed by: INTERNAL MEDICINE

## 2019-12-10 RX ORDER — ATORVASTATIN CALCIUM 40 MG/1
40 TABLET, FILM COATED ORAL DAILY
Qty: 30 TABLET | Refills: 11 | Status: SHIPPED | OUTPATIENT
Start: 2019-12-10 | End: 2020-06-03

## 2019-12-10 ASSESSMENT — MIFFLIN-ST. JEOR: SCORE: 1483.25

## 2019-12-10 NOTE — TELEPHONE ENCOUNTER
Referral received by Olga Arndt MD.   Second referral received for pt to schedule with vascular surgery for carotid stenosis.     Routing to  to reach out to pt again to schedule OV as soon as possible.     Please see tele enc 11/15/19 noting 1st referral info.     THUY MendezN, RN  [facility]

## 2019-12-10 NOTE — LETTER
12/10/2019    El Mejia MD  [employer] [address]  [facility] [address]    RE: Tu Barraza       Dear Colleague,    I had the pleasure of seeing Tu Barraza in the [facility].    CARDIOLOGY CLINIC VISIT  DATE OF SERVICE:  December 10, 2019      PRIMARY CARE PHYSICIAN:  El Mejia    HISTORY OF PRESENT ILLNESS:  Mr. Barraza is a healthy 84-year-old with PMH significant for atrial fibrillation, embolic CVA who presents to clinic today for follow up.  He was last seen by Rosa Zaidi CNP in 11/18/2019.  In brief review,   in early October he and his wife are exercising, he then went in the hot tub and did not feel quite right.  When he got home he felt worse and ultimately went to the ED where he was seen for right-sided weakness, numbing and tingling, code stroke was called and he received TPA. MRI demonstrated scattered diffuse restrictions bihemispheric consistent with cardioembolic source.  ECG demonstrated atrial fibrillation with slow ventricular response.  He has Q waves anteriorly.  His echocardiogram demonstrated septal hypokinesis, EF 45-50%.   He was started on apixaban and atorvastatin.  He had a CT head and neck which demonstrated moderate atherosclerotic disease with calcified and noncalcified plaques in the proximal left internal carotid artery resulting in 70% stenosis.  He had a 50% stenosis of the origin of the left vertebral artery.  His TSH was normal.  He is not a snorer.  He does not have hypertension, in fact by my check today off of antihypertensives his blood pressure is 110/65.  He was not put on beta-blockers because of his bradycardia.  He did have anterior Q waves.  In follow up today he continues to feel well. He denies any exertional chest pain, chest discomfort, shortness of breath.  He denies heart palpitations, light-headedness, dizziness.  His wife notes he seems more fatigued, but Tu denies this complaint.  He continues to  work out on a daily basis doing a combination of cardiovascular exercise (incluidng HIIT) and strength training.        PAST MEDICAL HISTORY:  1.  Atrial fibrillation  2.  Asthma  3.  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  Hyperlipidemia    MEDICATIONS:  Current Outpatient Medications   Medication     apixaban ANTICOAGULANT (ELIQUIS) 5 MG tablet     atorvastatin (LIPITOR) 40 MG tablet     FIBER PO     multivitamin w/minerals (MULTI-VITAMIN) tablet     Omega-3 Fatty Acids (FISH OIL PO)     aspirin 81 MG EC tablet     No current facility-administered medications for this visit.        ALLERGIES:  Allergies   Allergen Reactions     Penicillins        REVIEW OF SYSTEMS:  A complete ROS was obtained and the pertinent positives are outlined in the history of present illness above.  The remainder of systems is negative.      PHYSICAL EXAM:      BP: 137/82 Pulse: 66     SpO2: 99 %      Vital Signs with Ranges  Pulse:  [66] 66  BP: (137-156)/(82-90) 137/82  SpO2:  [99 %] 99 %  177 lbs 0 oz    Constitutional: awake, alert, no distress  Eyes: PERRL, sclera nonicteric  ENT: trachea midline  Respiratory: CTAB  Cardiovascular:  Irregularly irregular no m/r/g, no JVD  GI: nondistended, nontender, bowel sounds present  Lymph/Hematologic: no lymphadenopathy  Skin: dry, no rash  Musculoskeletal: good muscle tone, no edema bilaterally  Neurologic: no focal deficits  Neuropsychiatric: appropriate affact    DATA:  Reviewed in Epic    Echocardiogram:  1. The left ventricle is normal in size. The visual ejection fraction is  estimated at 50%. Mid-distal anteroseptal and apical hypokinesis.  2. The right ventricle is normal in structure, function and size.  3. There is mild (1+) tricuspid regurgitation. The right ventricular systolic  pressure is approximated at 41mmHg plus the right atrial pressure.  When directly compared to echo from 10-8-19 the EF and wall motion appear  similar.        ASSESSMENT:  1.  Atrial fibrillation with controlled  ventricular response:  Asymptomatic  2.  Cardioembolic CVA in 10/2019  3.  Mild LV dysfunction  4.  Coronary artery disease:  By wall motion abnormalities on echocardiogram and Q waves anteriorly.    5.  Carotid artery disease:  70% left internal carotid artery stenosis    RECOMMENDATIONS:  1. Reviewed pathophysiology of atrial fibrillation.  Holter monitor demonstrates afib with slow ventricular response, however, appropriate response to exercise.  He is entirely asymptomatic and is tolerating apixaban without difficulty.  Given this, will continue with rate control strategy.  2. We discussed findings on his echocardiogram with anteroseptal and apical wall motion abnormalities consistent with prior infarct.  Again, he is entirely asymptomatic.  We discussed the option of further risk stratification with stress imaging, however, he would prefer to continue with medical management which is reasonable.  He has declined statin with concomitant eliquis.    3.  Recheck fasting lipid panel.  May further titrate atorvastatin.  4.  Vascular surgery consult for surveillance of carotid artery disease.    5.  Follow up in 6 months with Rosa Zaidi CNP.  If doing well at that point, will plan on yearly follow up.    Olga Arndt MD  Cardiology - [facility]  Pager:  [phone]  December 10, 2019    Thank you for allowing me to participate in the care of your patient.    Sincerely,     Olga Arndt MD     [facility]

## 2019-12-10 NOTE — PROGRESS NOTES
CARDIOLOGY CLINIC VISIT  DATE OF SERVICE:  December 10, 2019      PRIMARY CARE PHYSICIAN:  El Mejia    HISTORY OF PRESENT ILLNESS:  Mr. Barraza is a healthy 84-year-old with PMH significant for atrial fibrillation, embolic CVA who presents to clinic today for follow up.  He was last seen by Rosa Zaidi CNP in 11/18/2019.  In brief review,   in early October he and his wife are exercising, he then went in the hot tub and did not feel quite right.  When he got home he felt worse and ultimately went to the ED where he was seen for right-sided weakness, numbing and tingling, code stroke was called and he received TPA. MRI demonstrated scattered diffuse restrictions bihemispheric consistent with cardioembolic source.  ECG demonstrated atrial fibrillation with slow ventricular response.  He has Q waves anteriorly.  His echocardiogram demonstrated septal hypokinesis, EF 45-50%.   He was started on apixaban and atorvastatin.  He had a CT head and neck which demonstrated moderate atherosclerotic disease with calcified and noncalcified plaques in the proximal left internal carotid artery resulting in 70% stenosis.  He had a 50% stenosis of the origin of the left vertebral artery.  His TSH was normal.  He is not a snorer.  He does not have hypertension, in fact by my check today off of antihypertensives his blood pressure is 110/65.  He was not put on beta-blockers because of his bradycardia.  He did have anterior Q waves.  In follow up today he continues to feel well. He denies any exertional chest pain, chest discomfort, shortness of breath.  He denies heart palpitations, light-headedness, dizziness.  His wife notes he seems more fatigued, but Tu denies this complaint.  He continues to work out on a daily basis doing a combination of cardiovascular exercise (incluidng HIIT) and strength training.        PAST MEDICAL HISTORY:  1.  Atrial fibrillation  2.  Asthma  3.  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   Hyperlipidemia    MEDICATIONS:  Current Outpatient Medications   Medication     apixaban ANTICOAGULANT (ELIQUIS) 5 MG tablet     atorvastatin (LIPITOR) 40 MG tablet     FIBER PO     multivitamin w/minerals (MULTI-VITAMIN) tablet     Omega-3 Fatty Acids (FISH OIL PO)     aspirin 81 MG EC tablet     No current facility-administered medications for this visit.        ALLERGIES:  Allergies   Allergen Reactions     Penicillins        REVIEW OF SYSTEMS:  A complete ROS was obtained and the pertinent positives are outlined in the history of present illness above.  The remainder of systems is negative.      PHYSICAL EXAM:      BP: 137/82 Pulse: 66     SpO2: 99 %      Vital Signs with Ranges  Pulse:  [66] 66  BP: (137-156)/(82-90) 137/82  SpO2:  [99 %] 99 %  177 lbs 0 oz    Constitutional: awake, alert, no distress  Eyes: PERRL, sclera nonicteric  ENT: trachea midline  Respiratory: CTAB  Cardiovascular:  Irregularly irregular no m/r/g, no JVD  GI: nondistended, nontender, bowel sounds present  Lymph/Hematologic: no lymphadenopathy  Skin: dry, no rash  Musculoskeletal: good muscle tone, no edema bilaterally  Neurologic: no focal deficits  Neuropsychiatric: appropriate affact    DATA:  Reviewed in Epic    Echocardiogram:  1. The left ventricle is normal in size. The visual ejection fraction is  estimated at 50%. Mid-distal anteroseptal and apical hypokinesis.  2. The right ventricle is normal in structure, function and size.  3. There is mild (1+) tricuspid regurgitation. The right ventricular systolic  pressure is approximated at 41mmHg plus the right atrial pressure.  When directly compared to echo from 10-8-19 the EF and wall motion appear  similar.        ASSESSMENT:  1.  Atrial fibrillation with controlled ventricular response:  Asymptomatic  2.  Cardioembolic CVA in 10/2019  3.  Mild LV dysfunction  4.  Coronary artery disease:  By wall motion abnormalities on echocardiogram and Q waves anteriorly.    5.  Carotid artery  disease:  70% left internal carotid artery stenosis    RECOMMENDATIONS:  1. Reviewed pathophysiology of atrial fibrillation.  Holter monitor demonstrates afib with slow ventricular response, however, appropriate response to exercise.  He is entirely asymptomatic and is tolerating apixaban without difficulty.  Given this, will continue with rate control strategy.  2. We discussed findings on his echocardiogram with anteroseptal and apical wall motion abnormalities consistent with prior infarct.  Again, he is entirely asymptomatic.  We discussed the option of further risk stratification with stress imaging, however, he would prefer to continue with medical management which is reasonable.  He has declined statin with concomitant eliquis.    3.  Recheck fasting lipid panel.  May further titrate atorvastatin.  4.  Vascular surgery consult for surveillance of carotid artery disease.    5.  Follow up in 6 months with Rosa Zaidi CNP.  If doing well at that point, will plan on yearly follow up.    Olga Arndt MD  Cardiology - [facility]  Pager:  [phone]  December 10, 2019

## 2019-12-10 NOTE — PATIENT INSTRUCTIONS
-Continue atorvastatin 40 mg daily  -Continue eliquis  -Fasting blood work (cholesterol)  -Vascular clinic follow up carotid artery disease  -Follow up with Rosa Zaidi CNP in 6 months

## 2019-12-11 NOTE — TELEPHONE ENCOUNTER
December 11, 2019      Patient is scheduled for New Vascular Surgery Consult with Dr. Starr on 1/8/2020 at [facility].     Patient needed some reassuring that this appointment is just the initial consult.   Patient originally wanted to pursue referral in Spring 2020 but after further explanation, he agreed to schedule the initial consult with Matty.       Deb Marin  Appointment Scheduling   [facility] [place]  Office: [phone]  Fax: [phone]  Direct: [phone]

## 2019-12-15 ENCOUNTER — HEALTH MAINTENANCE LETTER (OUTPATIENT)
Age: 84
End: 2019-12-15

## 2019-12-17 DIAGNOSIS — E78.5 HYPERLIPIDEMIA LDL GOAL <100: ICD-10-CM

## 2019-12-17 LAB
CHOLEST SERPL-MCNC: 134 MG/DL
HDLC SERPL-MCNC: 61 MG/DL
LDLC SERPL CALC-MCNC: 65 MG/DL
NONHDLC SERPL-MCNC: 73 MG/DL
TRIGL SERPL-MCNC: 41 MG/DL

## 2019-12-17 PROCEDURE — 36415 COLL VENOUS BLD VENIPUNCTURE: CPT | Performed by: INTERNAL MEDICINE

## 2019-12-17 PROCEDURE — 80061 LIPID PANEL: CPT | Performed by: INTERNAL MEDICINE

## 2020-01-08 ENCOUNTER — OFFICE VISIT (OUTPATIENT)
Dept: SURGERY | Facility: CLINIC | Age: 85
End: 2020-01-08
Payer: COMMERCIAL

## 2020-01-08 VITALS
HEART RATE: 76 BPM | RESPIRATION RATE: 16 BRPM | WEIGHT: 172 LBS | SYSTOLIC BLOOD PRESSURE: 124 MMHG | DIASTOLIC BLOOD PRESSURE: 80 MMHG | OXYGEN SATURATION: 98 % | HEIGHT: 69 IN | BODY MASS INDEX: 25.48 KG/M2

## 2020-01-08 DIAGNOSIS — I65.22 ASYMPTOMATIC STENOSIS OF LEFT CAROTID ARTERY: Primary | ICD-10-CM

## 2020-01-08 PROCEDURE — 99203 OFFICE O/P NEW LOW 30 MIN: CPT | Performed by: SURGERY

## 2020-01-08 ASSESSMENT — ENCOUNTER SYMPTOMS
EYES NEGATIVE: 1
CONSTITUTIONAL NEGATIVE: 1
GASTROINTESTINAL NEGATIVE: 1
ENDOCRINE NEGATIVE: 1
NEUROLOGICAL NEGATIVE: 1
BRUISES/BLEEDS EASILY: 1
RESPIRATORY NEGATIVE: 1
ALLERGIC/IMMUNOLOGIC NEGATIVE: 1
IRREGULAR HEARTBEAT: 1
MUSCULOSKELETAL NEGATIVE: 1

## 2020-01-08 ASSESSMENT — MIFFLIN-ST. JEOR: SCORE: 1460.57

## 2020-01-09 ENCOUNTER — TELEPHONE (OUTPATIENT)
Dept: OTHER | Facility: CLINIC | Age: 85
End: 2020-01-09

## 2020-01-09 DIAGNOSIS — I65.22 LEFT CAROTID STENOSIS: Primary | ICD-10-CM

## 2020-01-09 NOTE — PROGRESS NOTES
[facility] INITIAL VASCULAR SURGERY CONSULT    Impression:   1.  Asymptomatic approximately 70% left carotid stenosis.    2.  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    3.  Minimal right carotid stenosis.    Plan:   I had a lengthy discussion with Mr. Barraza and his wife reviewing all the above.  They understand that his left carotid stenosis is not the etiology behind his bilateral cerebral infarcts.  I therefore classify his left carotid stenosis as asymptomatic.  We discussed the ACAS trial and the natural history of asymptomatic carotid stenoses.  We discussed his age, his longevity, and his other medical issues.  The left carotid stenosis is best seen on the reformatted images.  I will obtain a bilateral carotid ultrasound to better quantify flow.  I will meet with him following that study to make a final determination regarding continued observation with best medical management versus prophylactic left carotid endarterectomy.  In the meantime he will continue with his Eliquis for atrial fibrillation.      HPI:   Tu Barraza is a healthy, active 84-year-old gentleman admitted to [facility] on 10/7/2019 with right-sided weakness and dysarthria.  MRI of the brain revealed bilateral infarcts and he was newly diagnosed with atrial fibrillation.  This was felt to be a cardioembolic event.  A code stroke was called in the emergency room and he received TPA.  He fully recovered from that event.  Subsequent carotid CTA was suggestive of a 70% left carotid stenosis.  This is felt to be an asymptomatic lesion but he is referred today for vascular surgical consultation.  He is on Eliquis for his atrial fibrillation.  At today's visit he is without complaints.    CURRENT MEDICATIONS  apixaban ANTICOAGULANT (ELIQUIS) 5 MG tablet, Take 1 tablet (5 mg) by mouth 2 times daily  atorvastatin (LIPITOR) 40 MG tablet,  "Take 1 tablet (40 mg) by mouth daily  FIBER PO,   multivitamin w/minerals (MULTI-VITAMIN) tablet, Take 1 tablet by mouth daily  Omega-3 Fatty Acids (FISH OIL PO),     No current facility-administered medications on file prior to visit.         PAST MEDICAL HISTORY  Past Medical History:   Diagnosis Date     Atrial fibrillation, new onset (H) 10/8/2019     Cerebral infarction (H)      Uncomplicated asthma          PAST SURGICAL HISTORY:  Past Surgical History:   Procedure Laterality Date     COLONOSCOPY  4/30/2014    Procedure: COMBINED COLONOSCOPY, SINGLE BIOPSY/POLYPECTOMY BY BIOPSY;  Surgeon: Eros Licona MD;  Location:  GI       ALLERGIES     Allergies   Allergen Reactions     Penicillins        FAMILY HISTORY  Family History   Problem Relation Age of Onset     Cancer - colorectal Father        SOCIAL HISTORY  Social History     Tobacco Use     Smoking status: Never Smoker     Smokeless tobacco: Never Used   Substance Use Topics     Alcohol use: Yes     Comment: 3-4 times a week     Drug use: No       ROS:   Review of Systems   Constitution: Negative.   Eyes: Negative.    Cardiovascular: Positive for irregular heartbeat.   Respiratory: Negative.    Endocrine: Negative.    Hematologic/Lymphatic: Bruises/bleeds easily.   Skin: Negative.    Musculoskeletal: Negative.    Gastrointestinal: Negative.    Genitourinary: Negative.    Neurological: Negative.    Allergic/Immunologic: Negative.          EXAM:  /80 (BP Location: Right arm, Patient Position: Sitting, Cuff Size: Adult Regular)   Pulse 76   Resp 16   Ht 5' 9\" (1.753 m)   Wt 172 lb (78 kg)   SpO2 98%   BMI 25.40 kg/m    Physical Exam  Vitals signs and nursing note reviewed.   Constitutional:       Appearance: Normal appearance.   HENT:      Head: Normocephalic.   Eyes:      General: No scleral icterus.     Extraocular Movements: Extraocular movements intact.      Pupils: Pupils are equal, round, and reactive to light.   Neck:      " Musculoskeletal: Normal range of motion.   Cardiovascular:      Rate and Rhythm: Rhythm irregular.      Pulses:           Radial pulses are 2+ on the right side and 2+ on the left side.        Posterior tibial pulses are 1+ on the right side and 1+ on the left side.   Musculoskeletal: Normal range of motion.      Right lower leg: No edema.      Left lower leg: No edema.   Skin:     General: Skin is warm and dry.   Neurological:      General: No focal deficit present.      Mental Status: He is alert and oriented to person, place, and time.      Cranial Nerves: No cranial nerve deficit.   Psychiatric:         Mood and Affect: Mood normal.         Behavior: Behavior normal.         Thought Content: Thought content normal.         Judgment: Judgment normal.     Labs:  LIPID RESULTS:  Lab Results   Component Value Date    CHOL 134 12/17/2019    HDL 61 12/17/2019    LDL 65 12/17/2019    TRIG 41 12/17/2019       CBC RESULTS:  Lab Results   Component Value Date    WBC 11.9 (H) 10/09/2019    RBC 4.71 10/09/2019    HGB 15.0 10/09/2019    HCT 45.2 10/09/2019    MCV 96 10/09/2019    MCH 31.8 10/09/2019    MCHC 33.2 10/09/2019    RDW 13.1 10/09/2019     10/09/2019       BMP RESULTS:  Lab Results   Component Value Date     10/09/2019    POTASSIUM 3.9 10/09/2019    CHLORIDE 107 10/09/2019    CO2 27 10/09/2019    ANIONGAP 4 10/09/2019    GLC 91 10/09/2019    BUN 25 10/09/2019    CR 1.08 10/09/2019    GFRESTIMATED 63 10/09/2019    GFRESTBLACK 73 10/09/2019    CRUZ 8.5 10/09/2019        A1C RESULTS:  Lab Results   Component Value Date    A1C 5.7 (H) 10/08/2019         Imaging:    CTA  HEAD NECK WITH CONTRAST 10/7/2019 5:00 PM      HISTORY: Code stroke. Right arm numbness and weakness.     TECHNIQUE: Axial images were obtained through the head and neck with  intravenous contrast. 70 mL of Isovue-370 was given. Multiplanar  reconstructions were performed. 3-D reconstructions off a remote  workstation for CT angiography were  also acquired. Carotid stenoses  were evaluated by comparing the caliber of the proximal internal  carotid artery to the caliber of the distal internal carotid artery.  Radiation dose for this scan was reduced using automated exposure  control, adjustment of the mA and/or kV according to patient size, or  iterative reconstruction technique..     FINDINGS:     Brachiocephalic vessels: Minimal calcific and noncalcified plaque. No  stenosis.     Right carotid system: Mild calcific plaque. No stenosis or dissection.     Left carotid system: Moderate calcified and noncalcified plaque  resulting in approximately 50% stenosis.     Right vertebral artery: Normal.     Left vertebral artery: Calcified plaque is seen near the origin. There  is approximately 50% stenosis. No dissection.     [place]: Normal.     Other findings: Some degenerative changes are seen in the spine.                                                                      IMPRESSION:  1. Moderate atherosclerotic disease with calcified and noncalcified  plaque in the proximal left internal carotid artery resulting in 70%  stenosis. This is best seen on the 3-D reformatted isolated left  siphon internal carotid artery images.  2. 50% stenosis at the origin left vertebral artery.  3. No evidence for intracranial thromboembolism or aneurysm.     KORY GONZALEZ MD      Total face-to-face time was 30 minutes, greater than 50% spent providing counseling and education.      Salinas Starr MD

## 2020-01-10 NOTE — TELEPHONE ENCOUNTER
January 10, 2020    Left urgent message for patient to contact [facility] to be scheduled for US and OV with Dr. Starr on 1/15/2020 at [facility].     RSCCUS2 & 1/15/2020 w/ [facility] appointments are being reserved for patient.     Deb Marin  Appointment Scheduling   [facility]  Office: [phone]  Fax: [phone]  Direct: [phone]

## 2020-01-10 NOTE — TELEPHONE ENCOUNTER
Per Dr. Starr, pt needs bilateral carotid US and OV in one week.      Routing to scheduling to contact pt to coordinate.    THUY HuertaN, RN  [facility] [place]

## 2020-01-15 ENCOUNTER — HOSPITAL ENCOUNTER (OUTPATIENT)
Dept: ULTRASOUND IMAGING | Facility: CLINIC | Age: 85
Discharge: HOME | End: 2020-01-15
Attending: SURGERY | Admitting: SURGERY
Payer: COMMERCIAL

## 2020-01-15 ENCOUNTER — OFFICE VISIT (OUTPATIENT)
Dept: SURGERY | Facility: CLINIC | Age: 85
End: 2020-01-15
Attending: SURGERY
Payer: COMMERCIAL

## 2020-01-15 VITALS
OXYGEN SATURATION: 97 % | BODY MASS INDEX: 25.48 KG/M2 | HEART RATE: 64 BPM | RESPIRATION RATE: 16 BRPM | DIASTOLIC BLOOD PRESSURE: 80 MMHG | SYSTOLIC BLOOD PRESSURE: 130 MMHG | HEIGHT: 69 IN | WEIGHT: 172 LBS

## 2020-01-15 DIAGNOSIS — I65.22 LEFT CAROTID STENOSIS: ICD-10-CM

## 2020-01-15 DIAGNOSIS — I65.22 ASYMPTOMATIC STENOSIS OF LEFT CAROTID ARTERY: Primary | ICD-10-CM

## 2020-01-15 PROCEDURE — 93880 EXTRACRANIAL BILAT STUDY: CPT

## 2020-01-15 PROCEDURE — 99213 OFFICE O/P EST LOW 20 MIN: CPT | Performed by: SURGERY

## 2020-01-15 ASSESSMENT — MIFFLIN-ST. JEOR: SCORE: 1460.57

## 2020-01-22 ENCOUNTER — TELEPHONE (OUTPATIENT)
Dept: MEDSURG UNIT | Facility: CLINIC | Age: 85
End: 2020-01-22

## 2020-01-26 NOTE — PROGRESS NOTES
Mr. Barraza returns today to discuss the results of a bilateral carotid ultrasound.  Please see my full vascular surgical consultation from 1/8/2020.  He was hospitalized in October 2019 for right-sided weakness and dysarthria.  MRI of the brain revealed bilateral infarcts and he was newly diagnosed with atrial fibrillation.  Subsequent imaging was suggestive of an approximately 70% left ICA stenosis.  This was best visualized on reformatted three-dimensional imaging and I was suspicious that there was a component of artifact.  We therefore obtained bilateral carotid ultrasonography earlier today to better characterize the lesion.  There has been no other change in his health history or his physical exam.  He remains on Eliquis.    Imaging:  BILATERAL CAROTID ULTRASOUND January 15, 2020 1:54 PM      HISTORY: History of left carotid artery stenosis.     COMPARISON: CTA 10/7/2019.      RIGHT CAROTID FINDINGS: There is calcified and noncalcified  atherosclerotic plaque in the carotid bifurcation.   Right ICA PSV: 117 cm/sec.   Right ICA EDV: 21 cm/sec.   Right ICA/CCA PSV Ratio: 0.8.   These indicate Less than 50% diameter stenosis of the right ICA.   Right Vertebral: Antegrade flow.    Right ECA: Antegrade flow.      LEFT CAROTID FINDINGS: There is calcified and noncalcified  atherosclerotic plaque in the carotid bifurcation.   Left ICA PSV: 226 cm/sec.  Left ICA EDV: 59 cm/sec.  Left ICA/CCA PSV Ratio: 2.1.    These indicate 50 - 69% diameter stenosis of the left ICA.    Left Vertebral: Antegrade flow.    Left ECA: Antegrade flow.      Causes of Decreased Accuracy: None.                                                                       IMPRESSION:    1. Less than 50% diameter stenosis of the right internal carotid  artery relative to the distal internal carotid artery diameter.   2. 50 - 69% diameter stenosis of the left internal carotid artery  relative to the distal internal carotid artery  diameter.       ASSESSMENT:  1.  Asymptomatic 50 to 69% left carotid stenosis not yet in need of repair in this 84-year-old gentleman who recently suffered a cardioembolic stroke related to newly diagnosed atrial fibrillation.    RECOMMENDATION:  I reviewed all the above with Tu and his wife.  I recommend continued observation of the asymptomatic left carotid stenosis.  They are in complete agreement.  He will continue his medical regimen including daily Eliquis.  Vascular surgical follow-up will be with me in 1 year for repeat bilateral carotid ultrasound.    Total face-to-face time was 15 minutes, greater than 50% spent providing counseling and education.    Thanh Starr MD

## 2020-06-03 ENCOUNTER — VIRTUAL VISIT (OUTPATIENT)
Dept: CARDIOLOGY | Facility: CLINIC | Age: 85
End: 2020-06-03
Attending: INTERNAL MEDICINE
Payer: COMMERCIAL

## 2020-06-03 VITALS
WEIGHT: 171 LBS | BODY MASS INDEX: 25.33 KG/M2 | HEIGHT: 69 IN | SYSTOLIC BLOOD PRESSURE: 136 MMHG | HEART RATE: 54 BPM | DIASTOLIC BLOOD PRESSURE: 80 MMHG

## 2020-06-03 DIAGNOSIS — I48.21 PERMANENT ATRIAL FIBRILLATION (H): ICD-10-CM

## 2020-06-03 DIAGNOSIS — I63.9 CEREBROVASCULAR ACCIDENT (CVA), UNSPECIFIED MECHANISM (H): ICD-10-CM

## 2020-06-03 DIAGNOSIS — I48.91 ATRIAL FIBRILLATION, NEW ONSET (H): ICD-10-CM

## 2020-06-03 DIAGNOSIS — E78.5 HYPERLIPIDEMIA LDL GOAL <100: Primary | ICD-10-CM

## 2020-06-03 PROCEDURE — 99213 OFFICE O/P EST LOW 20 MIN: CPT | Mod: 95 | Performed by: NURSE PRACTITIONER

## 2020-06-03 RX ORDER — ATORVASTATIN CALCIUM 40 MG/1
40 TABLET, FILM COATED ORAL DAILY
Qty: 90 TABLET | Refills: 3 | Status: SHIPPED | OUTPATIENT
Start: 2020-06-03 | End: 2021-08-04

## 2020-06-03 ASSESSMENT — MIFFLIN-ST. JEOR: SCORE: 1456.03

## 2020-06-03 NOTE — LETTER
6/3/2020    El Mejia MD  [employer] [facility] [address]  [facility] [address]    RE: Tu Barraza       Dear Colleague,    I had the pleasure of seeing Tu Barraza in the [facility].    Tu Barraza is a 84 year old male who is being evaluated via a billable video visit.      I had the pleasure of seeing Tu Barraza today in a video cardiology clinic follow up. He is a pleasant 84 year old patient of Dr. Arndt.    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 He was found to be in afib with slow ventricular response. EF 45-50% with anteroseptal and apical wall motion abnormalities consistent with prior infarct. Given hi lack of symptoms, he has wanted to treat medically. He has carotid artery disease followed by Dr. Starr. He is not a snorer.  He does not have hypertension.      Today he is doing well and six-month follow-up.  His blood pressure is controlled, generally in the 130s to low 140s.  He is taking Lipitor and Eliquis.  Dr. Starr plans repeat the carotid ultrasound next year.  He denies any chest pain, shortness of breath, PND or orthopnea.      Labs Reviewed: Cholesterol 134, HDL 61, LDL 65, creatinine 1.08, hemoglobin 15, A1c 5.7    Physical Exam      Assessment and Plan  1.  Atrial fibrillation with controlled ventricular response.  He is anticoagulated and asymptomatic  2.  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  He is followed by Dr. Starr for his carotid artery disease.  He is on Eliquis.  3.  Coronary artery disease based on wall motion abnormalities on echo and Q waves at anteriorly.  He is on statin therapy.  He is asymptomatic and at this point does not want to pursue any further evaluation.    Thank you for allowing me to care for Tu Barraza today.  He should follow-up with Dr. Abarca in 1 years time.    LIZZIE Boone, CNP  Cardiology    Voice recognition software was used for this note, I have reviewed this  note, but errors may have been missed.    No orders of the defined types were placed in this encounter.    No orders of the defined types were placed in this encounter.    There are no discontinued medications.      CURRENT MEDICATIONS:  Current Outpatient Medications   Medication Sig Dispense Refill     apixaban ANTICOAGULANT (ELIQUIS) 5 MG tablet Take 1 tablet (5 mg) by mouth 2 times daily 60 tablet 0     atorvastatin (LIPITOR) 40 MG tablet Take 1 tablet (40 mg) by mouth daily 30 tablet 11     FIBER PO        multivitamin w/minerals (MULTI-VITAMIN) tablet Take 1 tablet by mouth daily       Omega-3 Fatty Acids (FISH OIL PO)          ALLERGIES     Allergies   Allergen Reactions     Penicillins        PAST MEDICAL HISTORY:  Past Medical History:   Diagnosis Date     Atrial fibrillation, new onset (H) 10/8/2019     Cerebral infarction (H)      Uncomplicated asthma        PAST SURGICAL HISTORY:  Past Surgical History:   Procedure Laterality Date     COLONOSCOPY  4/30/2014    Procedure: COMBINED COLONOSCOPY, SINGLE BIOPSY/POLYPECTOMY BY BIOPSY;  Surgeon: Eros Licona MD;  Location:  GI       FAMILY HISTORY:  Family History   Problem Relation Age of Onset     Cancer - colorectal Father        SOCIAL HISTORY:  Social History     Socioeconomic History     Marital status:      Spouse name: None     Number of children: None     Years of education: None     Highest education level: None   Occupational History     None   Social Needs     Financial resource strain: None     Food insecurity     Worry: None     Inability: None     Transportation needs     Medical: None     Non-medical: None   Tobacco Use     Smoking status: Never Smoker     Smokeless tobacco: Never Used   Substance and Sexual Activity     Alcohol use: Yes     Comment: 3-4 times a week     Drug use: No     Sexual activity: None   Lifestyle     Physical activity     Days per week: None     Minutes per session: None     Stress: None   Relationships      "Social connections     Talks on phone: None     Gets together: None     Attends Orthodox service: None     Active member of club or organization: None     Attends meetings of clubs or organizations: None     Relationship status: None     Intimate partner violence     Fear of current or ex partner: None     Emotionally abused: None     Physically abused: None     Forced sexual activity: None   Other Topics Concern     Parent/sibling w/ CABG, MI or angioplasty before 65F 55M? Not Asked   Social History Narrative     None       Review of Systems:  Skin:          Eyes:         ENT:         Respiratory:          Cardiovascular:         Gastroenterology:        Genitourinary:         Musculoskeletal:         Neurologic:         Psychiatric:         Heme/Lymph/Imm:         Endocrine:           Physical Exam:  Vitals: /80   Pulse 54   Ht 1.753 m (5' 9\")   Wt 77.6 kg (171 lb)   BMI 25.25 kg/m      Constitutional:           Skin:             Head:           Eyes:           Lymph:      ENT:           Neck:           Respiratory:            Cardiac:                                                           GI:           Extremities and Muscular Skeletal:                 Neurological:           Psych:       Encounter Diagnoses   Name Primary?     Permanent atrial fibrillation      Hyperlipidemia LDL goal <100 Yes       Recent Lab Results:  LIPID RESULTS:  Lab Results   Component Value Date    CHOL 134 12/17/2019    HDL 61 12/17/2019    LDL 65 12/17/2019    TRIG 41 12/17/2019       LIVER ENZYME RESULTS:  Lab Results   Component Value Date    AST 23 10/08/2019    ALT 26 10/08/2019       CBC RESULTS:  Lab Results   Component Value Date    WBC 11.9 (H) 10/09/2019    RBC 4.71 10/09/2019    HGB 15.0 10/09/2019    HCT 45.2 10/09/2019    MCV 96 10/09/2019    MCH 31.8 10/09/2019    MCHC 33.2 10/09/2019    RDW 13.1 10/09/2019     10/09/2019       BMP RESULTS:  Lab Results   Component Value Date     10/09/2019    " POTASSIUM 3.9 10/09/2019    CHLORIDE 107 10/09/2019    CO2 27 10/09/2019    ANIONGAP 4 10/09/2019    GLC 91 10/09/2019    BUN 25 10/09/2019    CR 1.08 10/09/2019    GFRESTIMATED 63 10/09/2019    GFRESTBLACK 73 10/09/2019    CRUZ 8.5 10/09/2019        A1C RESULTS:  Lab Results   Component Value Date    A1C 5.7 (H) 10/08/2019       INR RESULTS:  Lab Results   Component Value Date    INR 1.03 10/07/2019         Thank you for allowing me to participate in the care of your patient.    Sincerely,     LIZZIE Juares [facility]

## 2020-06-03 NOTE — PROGRESS NOTES
"Tu Barraza is a 84 year old male who is being evaluated via a billable video visit.      The patient has been notified of following:     \"This video visit will be conducted via a call between you and your physician/provider. We have found that certain health care needs can be provided without the need for an in-person physical exam.  This service lets us provide the care you need with a video conversation.  If a prescription is necessary we can send it directly to your pharmacy.  If lab work is needed we can place an order for that and you can then stop by our lab to have the test done at a later time.    Video visits are billed at different rates depending on your insurance coverage.  Please reach out to your insurance provider with any questions.    If during the course of the call the physician/provider feels a video visit is not appropriate, you will not be charged for this service.\"    Patient has given verbal consent for Video visit? Yes    How would you like to [facility] your [facility]? [facility]    Patient would like the video invitation sent by: Text to cell phone: [phone]    Will anyone else be joining your video visit? No       Review Of Systems  Skin: NEGATIVE  Eyes:Ears/Nose/Throat: wears glasses  Respiratory: NEGATIVE  Cardiovascular:NEGATIVE  Gastrointestinal: NEGATIVE  Genitourinary:NEGATIVE  Musculoskeletal: NEGATIVE  Neurologic: slight numbness in right hand  Psychiatric: NEGATIVE  Hematologic/Lymphatic/Immunologic: NEGATIVE  Endocrine:  NEGATIVE  Vitals today are:  /80  Pulse 54  Weight   171.0 lb  CARMEN Cevallos    Video-Visit Details  HPI and Plan:   I had the pleasure of seeing Tu Barraza today in a video cardiology clinic follow up. He is a pleasant 84 year old patient of Dr. Arndt.    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 He was found to be in afib with slow ventricular response. EF 45-50% with anteroseptal and apical wall motion abnormalities consistent " with prior infarct. Given hi lack of symptoms, he has wanted to treat medically. He has carotid artery disease followed by Dr. Starr. He is not a snorer.  He does not have hypertension.      Today he is doing well and six-month follow-up.  His blood pressure is controlled, generally in the 130s to low 140s.  He is taking Lipitor and Eliquis.  Dr. Starr plans repeat the carotid ultrasound next year.  He denies any chest pain, shortness of breath, PND or orthopnea.      Labs Reviewed: Cholesterol 134, HDL 61, LDL 65, creatinine 1.08, hemoglobin 15, A1c 5.7    Physical Exam      Assessment and Plan  1.  Atrial fibrillation with controlled ventricular response.  He is anticoagulated and asymptomatic  2.  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  He is followed by Dr. Starr for his carotid artery disease.  He is on Eliquis.  3.  Coronary artery disease based on wall motion abnormalities on echo and Q waves at anteriorly.  He is on statin therapy.  He is asymptomatic and at this point does not want to pursue any further evaluation.    Thank you for allowing me to care for Tu Barraza today.  He should follow-up with Dr. Abarca in 1 years time.    LIZZIE Boone, CNP  Cardiology    Voice recognition software was used for this note, I have reviewed this note, but errors may have been missed.    No orders of the defined types were placed in this encounter.    No orders of the defined types were placed in this encounter.    There are no discontinued medications.      CURRENT MEDICATIONS:  Current Outpatient Medications   Medication Sig Dispense Refill     apixaban ANTICOAGULANT (ELIQUIS) 5 MG tablet Take 1 tablet (5 mg) by mouth 2 times daily 60 tablet 0     atorvastatin (LIPITOR) 40 MG tablet Take 1 tablet (40 mg) by mouth daily 30 tablet 11     FIBER PO        multivitamin w/minerals (MULTI-VITAMIN) tablet Take 1 tablet by mouth daily       Omega-3 Fatty Acids (FISH OIL PO)          ALLERGIES      Allergies   Allergen Reactions     Penicillins        PAST MEDICAL HISTORY:  Past Medical History:   Diagnosis Date     Atrial fibrillation, new onset (H) 10/8/2019     Cerebral infarction (H)      Uncomplicated asthma        PAST SURGICAL HISTORY:  Past Surgical History:   Procedure Laterality Date     COLONOSCOPY  4/30/2014    Procedure: COMBINED COLONOSCOPY, SINGLE BIOPSY/POLYPECTOMY BY BIOPSY;  Surgeon: Eros Licona MD;  Location:  GI       FAMILY HISTORY:  Family History   Problem Relation Age of Onset     Cancer - colorectal Father        SOCIAL HISTORY:  Social History     Socioeconomic History     Marital status:      Spouse name: None     Number of children: None     Years of education: None     Highest education level: None   Occupational History     None   Social Needs     Financial resource strain: None     Food insecurity     Worry: None     Inability: None     Transportation needs     Medical: None     Non-medical: None   Tobacco Use     Smoking status: Never Smoker     Smokeless tobacco: Never Used   Substance and Sexual Activity     Alcohol use: Yes     Comment: 3-4 times a week     Drug use: No     Sexual activity: None   Lifestyle     Physical activity     Days per week: None     Minutes per session: None     Stress: None   Relationships     Social connections     Talks on phone: None     Gets together: None     Attends [employer] service: None     Active member of club or organization: None     Attends meetings of clubs or organizations: None     Relationship status: None     Intimate partner violence     Fear of current or ex partner: None     Emotionally abused: None     Physically abused: None     Forced sexual activity: None   Other Topics Concern     Parent/sibling w/ CABG, MI or angioplasty before 65F 55M? Not Asked   Social History Narrative     None       Review of Systems:  Skin:          Eyes:         ENT:         Respiratory:          Cardiovascular:        "  Gastroenterology:        Genitourinary:         Musculoskeletal:         Neurologic:         Psychiatric:         Heme/Lymph/Imm:         Endocrine:           Physical Exam:  Vitals: /80   Pulse 54   Ht 1.753 m (5' 9\")   Wt 77.6 kg (171 lb)   BMI 25.25 kg/m      Constitutional:           Skin:             Head:           Eyes:           Lymph:      ENT:           Neck:           Respiratory:            Cardiac:                                                           GI:           Extremities and Muscular Skeletal:                 Neurological:           Psych:       Encounter Diagnoses   Name Primary?     Permanent atrial fibrillation      Hyperlipidemia LDL goal <100 Yes       Recent Lab Results:  LIPID RESULTS:  Lab Results   Component Value Date    CHOL 134 12/17/2019    HDL 61 12/17/2019    LDL 65 12/17/2019    TRIG 41 12/17/2019       LIVER ENZYME RESULTS:  Lab Results   Component Value Date    AST 23 10/08/2019    ALT 26 10/08/2019       CBC RESULTS:  Lab Results   Component Value Date    WBC 11.9 (H) 10/09/2019    RBC 4.71 10/09/2019    HGB 15.0 10/09/2019    HCT 45.2 10/09/2019    MCV 96 10/09/2019    MCH 31.8 10/09/2019    MCHC 33.2 10/09/2019    RDW 13.1 10/09/2019     10/09/2019       BMP RESULTS:  Lab Results   Component Value Date     10/09/2019    POTASSIUM 3.9 10/09/2019    CHLORIDE 107 10/09/2019    CO2 27 10/09/2019    ANIONGAP 4 10/09/2019    GLC 91 10/09/2019    BUN 25 10/09/2019    CR 1.08 10/09/2019    GFRESTIMATED 63 10/09/2019    GFRESTBLACK 73 10/09/2019    CRUZ 8.5 10/09/2019        A1C RESULTS:  Lab Results   Component Value Date    A1C 5.7 (H) 10/08/2019       INR RESULTS:  Lab Results   Component Value Date    INR 1.03 10/07/2019           CC  Olga Arndt MD  [facility] [place]  [address]  [place] [address]                  Type of service:  Video Visit  DOX    Video Start Time: 7.:40 AM  Video End Time: 7:47 AM    Originating Location " (pt. Location): Home    Distant Location (provider location):  [facility]     Platform used for Video Visit: LIZZIE Jaimes CNP

## 2020-12-30 DIAGNOSIS — I65.22 LEFT CAROTID STENOSIS: Primary | ICD-10-CM

## 2021-01-11 ENCOUNTER — HOSPITAL ENCOUNTER (OUTPATIENT)
Dept: ULTRASOUND IMAGING | Facility: CLINIC | Age: 86
Discharge: HOME | End: 2021-01-11
Attending: SURGERY | Admitting: SURGERY
Payer: COMMERCIAL

## 2021-01-11 DIAGNOSIS — I65.22 LEFT CAROTID STENOSIS: ICD-10-CM

## 2021-01-11 PROCEDURE — 93880 EXTRACRANIAL BILAT STUDY: CPT

## 2021-01-15 ENCOUNTER — HEALTH MAINTENANCE LETTER (OUTPATIENT)
Age: 86
End: 2021-01-15

## 2021-01-20 ENCOUNTER — VIRTUAL VISIT (OUTPATIENT)
Dept: SURGERY | Facility: CLINIC | Age: 86
End: 2021-01-20
Attending: SURGERY
Payer: COMMERCIAL

## 2021-01-20 VITALS — BODY MASS INDEX: 23.77 KG/M2 | WEIGHT: 166 LBS | HEIGHT: 70 IN

## 2021-01-20 DIAGNOSIS — I65.22 ASYMPTOMATIC STENOSIS OF LEFT CAROTID ARTERY: Primary | ICD-10-CM

## 2021-01-20 PROCEDURE — 99213 OFFICE O/P EST LOW 20 MIN: CPT | Mod: 95 | Performed by: SURGERY

## 2021-01-20 ASSESSMENT — MIFFLIN-ST. JEOR: SCORE: 1444.22

## 2021-01-20 NOTE — PROGRESS NOTES
Tu Barraza is an 85-year-old gentleman who is followed for an asymptomatic 50-69% left carotid stenosis.  He was hospitalized in October 2019 for bilateral infarcts with newly diagnosed atrial fibrillation.  He remains on Eliquis.  He underwent bilateral carotid ultrasound on 1/11/2021.  Due to the COVID-19 pandemic I am conducting a telephone interview with him today to discuss those results.    At today's visit he was in good spirits and without complaints.  Over the past year he denies stroke, symptoms of TIA, or amaurosis.    Imaging:  BILATERAL CAROTID ULTRASOUND   1/11/2021 2:58 PM      HISTORY: History of bilateral carotid artery stenosis. Left carotid  stenosis.     COMPARISON: 1/15/2020     RIGHT CAROTID FINDINGS:  Minimal plaque at the carotid bulb and  internal carotid artery.  Right ICA PSV:  96  cm/sec.  Right ICA EDV:  21 cm/sec.  Right ICA/CCA PSV Ratio:  1.1    These indicate less than 50% diameter stenosis of the right ICA.    Right Vertebral: Antegrade flow.   Right ECA: Antegrade flow.      LEFT CAROTID FINDINGS:  Plaque in the common carotid, carotid bulb and  internal carotid artery.  Left ICA PSV:  194  cm/sec.  Left ICA EDV:  55 cm/sec.  Left ICA/CCA PSV Ratio:  2.16    These indicate  50 - 69%  diameter stenosis of the left ICA.    Left Vertebral: Antegrade flow.   Left ECA: Antegrade flow.      Causes of Decreased Accuracy:   None.                                                                       IMPRESSION:    1. Less than 50% diameter stenosis of the right ICA relative to the  distal ICA diameter, unchanged.  2. 50-69% diameter stenosis of the left ICA relative to the distal ICA  diameter, unchanged     EARLE STOVALL DO      ASSESSMENT:  Asymptomatic 50-69% left carotid stenosis unchanged from 1 year ago and not yet in need of repair.    RECOMMENDATION:  I reviewed all the above with Tu.  He will continue his medical regimen including daily Eliquis.  Vascular surgical  follow-up will be with me in 1 year for repeat left carotid ultrasound.    Total length of this telephone conversation was 11 minutes.    Thanh Starr MD

## 2021-01-28 DIAGNOSIS — I65.22 LEFT CAROTID STENOSIS: Primary | ICD-10-CM

## 2021-08-03 DIAGNOSIS — I48.91 ATRIAL FIBRILLATION, NEW ONSET (H): ICD-10-CM

## 2021-08-04 DIAGNOSIS — I63.9 CEREBROVASCULAR ACCIDENT (CVA), UNSPECIFIED MECHANISM (H): ICD-10-CM

## 2021-08-04 RX ORDER — ATORVASTATIN CALCIUM 40 MG/1
40 TABLET, FILM COATED ORAL DAILY
Qty: 90 TABLET | Refills: 0 | Status: SHIPPED | OUTPATIENT
Start: 2021-08-04 | End: 2021-11-05

## 2021-08-08 ENCOUNTER — TELEPHONE (OUTPATIENT)
Dept: CARDIOLOGY | Facility: CLINIC | Age: 86
End: 2021-08-08

## 2021-08-08 DIAGNOSIS — I77.9 RIGHT-SIDED CAROTID ARTERY DISEASE, UNSPECIFIED TYPE (H): Primary | ICD-10-CM

## 2021-08-08 DIAGNOSIS — I63.9 CEREBROVASCULAR ACCIDENT (CVA), UNSPECIFIED MECHANISM (H): ICD-10-CM

## 2021-08-08 NOTE — PROGRESS NOTES
HISTORY OF PRESENT ILLNESS:    This is a 86 year old male who follows with Dr. Arndt at [facility]  His past medical history includes:  A-fib, s/p CVA, cardiomyopathy, presumed coronary artery disease, carotid artery disease, and hyperlipidemia    Mr Barraza suffered a stroke (10/2019) and found to have A-fib with a slow ventricular response, and cardiomyopathy with an anteroseptal wall motion abnormality.  Imaging revealed diffuse restrictions bihemispheric consistent with cardioembolic source. He was treated with tPA.  He was also found to have 70% proximal left internal carotid artery disease.  His ECHO showed LVEF 45-50% with anteroseptal and mid inferoseptal hypokinesis, normal RV function, negative bubble study, and no significant valvular pathology.  EKG showed anterior Q-waves.  Therefore it was presumed that he previously had suffered a silent myocardial infarction.  He was treated medically.  No beta-blocker was started due to underlying bradycardia.  He was placed on Eliquis and a statin    ECHO (11/2019) showed LVEF 50% with mid/distal anteroseptal and apical hypokinesis, normal RV function, 1+ TR, RVSP 41 mmHg    Holter (11/2019) showed A-fib with an average HR of 58 bpm and a 2% PVC burden.  There was no evidence of chronotropic incompetence.    Carotid ultrasound (1/2020 and 1/2021) both showed < 50% right internal carotid artery disease and 50-69% left internal carotid artery disease. Followed by Dr Starr    Our visit today is for an annual follow up    Mr Barraza is very active with minimal limitations.  He exercises by riding his bike or walking every day.  He is able to do all of his chores without limitations.  He denies any chest pain, shortness of breath, palpitations, near-syncope, or orthopnea.  At times he notices some mild right ankle swelling    He checks his BP at home and tells me that his SBP stays around 140 mmHg on a regular basis.      I reviewed labs today:  Total  Cholesterol: 121  Triglycerides:  40  HDL: 65  LDL: 48  ALT: 32    VITAL SIGNS:  BP: 158/80  Pulse: 50  Weight: 168 lbs (stable)  BMI: 24      IMPRESSION AND PLAN:    Permanent Atrial Fibrillation  -some underlying asymptomatic bradycardia not on any negative chronotropic agents  -denies any symptoms of bradycardia  -chronic Eliquis (CHADS2-Vasc score: 5)    S/p CVA (10/2019)  -treated with tPA  -no residual deficits  -moderate left ICA disease followed by Dr. Starr    Coronary Artery Disease  -presumed remote silent MI  -anteroseptal regional wall motion abnormalities  -denies angina  -continue medical management    Hypertension:  -discussed adding Lisinopril; however, he deferred  -return for follow up with Dr. Arndt in 3-4 months  -he is to call with persistent BPs > 140 mmHg on a regular basis     Mild Ischemic Cardiomyopathy  -LVEF 50%  -no signs and symptoms of heart failure  -weight stable    Hyperlipidemia:  -on Atorvastatin 40 mg  -LDL 40    The total time for the visit today was 30 minutes which includes patient visit, reviewing of records, discussion, and placing of orders of the outpatient coordination of cardiovascular care as described.  The level of medical decision making during this visit was of moderate complexity.  Thank you for allowing me to participate in their care.      Orders Placed This Encounter   Procedures     Follow-Up with Cardiologist       No orders of the defined types were placed in this encounter.      There are no discontinued medications.      Encounter Diagnoses   Name Primary?     Atrial fibrillation, new onset (H) Yes     Benign essential hypertension        CURRENT MEDICATIONS:  Current Outpatient Medications   Medication Sig Dispense Refill     apixaban ANTICOAGULANT (ELIQUIS) 5 MG tablet Take 1 tablet (5 mg) by mouth 2 times daily Appointment required for further refills 180 tablet 0     atorvastatin (LIPITOR) 40 MG tablet Take 1 tablet (40 mg) by mouth daily 90 tablet 0      FIBER PO        multivitamin w/minerals (MULTI-VITAMIN) tablet Take 1 tablet by mouth daily       Omega-3 Fatty Acids (FISH OIL PO)          ALLERGIES     Allergies   Allergen Reactions     Pcn [Penicillins]        PAST MEDICAL HISTORY:  Past Medical History:   Diagnosis Date     Atrial fibrillation, new onset (H) 10/8/2019     Cerebral infarction (H)      Uncomplicated asthma        PAST SURGICAL HISTORY:  Past Surgical History:   Procedure Laterality Date     COLONOSCOPY  4/30/2014    Procedure: COMBINED COLONOSCOPY, SINGLE BIOPSY/POLYPECTOMY BY BIOPSY;  Surgeon: Eros Licona MD;  Location:  GI       FAMILY HISTORY:  Family History   Problem Relation Age of Onset     Cancer - colorectal Father        SOCIAL HISTORY:  Social History     Socioeconomic History     Marital status:      Spouse name: None     Number of children: None     Years of education: None     Highest education level: None   Occupational History     None   Tobacco Use     Smoking status: Never Smoker     Smokeless tobacco: Never Used   Substance and Sexual Activity     Alcohol use: Yes     Comment: 3-4 times a week     Drug use: No     Sexual activity: None   Other Topics Concern     Parent/sibling w/ CABG, MI or angioplasty before 65F 55M? Not Asked   Social History Narrative     None     Social Determinants of Health     Financial Resource Strain:      Difficulty of Paying Living Expenses:    Food Insecurity:      Worried About Running Out of Food in the Last Year:      Ran Out of Food in the Last Year:    Transportation Needs:      Lack of Transportation (Medical):      Lack of Transportation (Non-Medical):    Physical Activity:      Days of Exercise per Week:      Minutes of Exercise per Session:    Stress:      Feeling of Stress :    Social Connections:      Frequency of Communication with Friends and Family:      Frequency of Social Gatherings with Friends and Family:      Attends [employer] Services:      Active Member of  "Clubs or Organizations:      Attends Club or Organization Meetings:      Marital Status:    Intimate Partner Violence:      Fear of Current or Ex-Partner:      Emotionally Abused:      Physically Abused:      Sexually Abused:        Review of Systems:  Skin:  Negative       Eyes:  Negative      ENT:  Negative      Respiratory:  Positive for shortness of breath;wheezing;cough cough improving, wheezing comes and goes   Cardiovascular:  Negative Positive for light swelling in the ankles per wife  Gastroenterology: Negative      Genitourinary:  Positive for urinary frequency    Musculoskeletal:  Negative      Neurologic:  Negative      Psychiatric:  Negative      Heme/Lymph/Imm:  Negative      Endocrine:  Negative        Physical Exam:  Vitals: BP (!) 158/80 (BP Location: Right arm, Patient Position: Sitting, Cuff Size: Adult Regular)   Pulse 50   Ht 1.753 m (5' 9\")   Wt 76.2 kg (168 lb 1.6 oz)   SpO2 98%   BMI 24.82 kg/m      Constitutional:  cooperative        Skin:  warm and dry to the touch          Head:  normocephalic        Eyes:  pupils equal and round        Lymph:      ENT:           Neck:  JVP normal   faint L carotid bruit    Respiratory:  clear to auscultation         Cardiac:   irregularly irregular rhythm;bradycardic              pulses full and equal                                        GI:  abdomen soft        Extremities and Muscular Skeletal:  no edema              Neurological:  no gross motor deficits        Psych:  Alert and Oriented x 3          CC  No referring provider defined for this encounter.                  "

## 2021-08-10 ENCOUNTER — OFFICE VISIT (OUTPATIENT)
Dept: CARDIOLOGY | Facility: CLINIC | Age: 86
End: 2021-08-10
Payer: COMMERCIAL

## 2021-08-10 ENCOUNTER — LAB (OUTPATIENT)
Dept: LAB | Facility: CLINIC | Age: 86
End: 2021-08-10
Payer: COMMERCIAL

## 2021-08-10 VITALS
WEIGHT: 168.1 LBS | OXYGEN SATURATION: 98 % | DIASTOLIC BLOOD PRESSURE: 80 MMHG | HEIGHT: 69 IN | HEART RATE: 50 BPM | BODY MASS INDEX: 24.9 KG/M2 | SYSTOLIC BLOOD PRESSURE: 158 MMHG

## 2021-08-10 DIAGNOSIS — I48.91 ATRIAL FIBRILLATION, NEW ONSET (H): Primary | ICD-10-CM

## 2021-08-10 DIAGNOSIS — I63.9 CEREBROVASCULAR ACCIDENT (CVA), UNSPECIFIED MECHANISM (H): ICD-10-CM

## 2021-08-10 DIAGNOSIS — I10 BENIGN ESSENTIAL HYPERTENSION: ICD-10-CM

## 2021-08-10 LAB
ALT SERPL W P-5'-P-CCNC: 32 U/L (ref 0–70)
CHOLEST SERPL-MCNC: 121 MG/DL
FASTING STATUS PATIENT QL REPORTED: YES
HDLC SERPL-MCNC: 65 MG/DL
LDLC SERPL CALC-MCNC: 48 MG/DL
NONHDLC SERPL-MCNC: 56 MG/DL
TRIGL SERPL-MCNC: 40 MG/DL

## 2021-08-10 PROCEDURE — 80061 LIPID PANEL: CPT

## 2021-08-10 PROCEDURE — 36415 COLL VENOUS BLD VENIPUNCTURE: CPT

## 2021-08-10 PROCEDURE — 84460 ALANINE AMINO (ALT) (SGPT): CPT

## 2021-08-10 PROCEDURE — 99214 OFFICE O/P EST MOD 30 MIN: CPT | Performed by: NURSE PRACTITIONER

## 2021-08-10 ASSESSMENT — MIFFLIN-ST. JEOR: SCORE: 1432.88

## 2021-08-10 NOTE — LETTER
8/10/2021    El Mejia MD  [address] [place] [address]    RE: Tu Barraza       Dear Colleague,    I had the pleasure of seeing Tu Barraza in the [facility].    HISTORY OF PRESENT ILLNESS:    This is a 86 year old male who follows with Dr. Arndt at [facility]  His past medical history includes:  A-fib, s/p CVA, cardiomyopathy, presumed coronary artery disease, carotid artery disease, and hyperlipidemia    Mr Barraza suffered a stroke (10/2019) and found to have A-fib with a slow ventricular response, and cardiomyopathy with an anteroseptal wall motion abnormality.  Imaging revealed diffuse restrictions bihemispheric consistent with cardioembolic source. He was treated with tPA.  He was also found to have 70% proximal left internal carotid artery disease.  His ECHO showed LVEF 45-50% with anteroseptal and mid inferoseptal hypokinesis, normal RV function, negative bubble study, and no significant valvular pathology.  EKG showed anterior Q-waves.  Therefore it was presumed that he previously had suffered a silent myocardial infarction.  He was treated medically.  No beta-blocker was started due to underlying bradycardia.  He was placed on Eliquis and a statin    ECHO (11/2019) showed LVEF 50% with mid/distal anteroseptal and apical hypokinesis, normal RV function, 1+ TR, RVSP 41 mmHg    Holter (11/2019) showed A-fib with an average HR of 58 bpm and a 2% PVC burden.  There was no evidence of chronotropic incompetence.    Carotid ultrasound (1/2020 and 1/2021) both showed < 50% right internal carotid artery disease and 50-69% left internal carotid artery disease. Followed by Dr Starr    Our visit today is for an annual follow up    Mr Barraza is very active with minimal limitations.  He exercises by riding his bike or walking every day.  He is able to do all of his chores without limitations.  He denies any chest pain, shortness of  breath, palpitations, near-syncope, or orthopnea.  At times he notices some mild right ankle swelling    He checks his BP at home and tells me that his SBP stays around 140 mmHg on a regular basis.      I reviewed labs today:  Total Cholesterol: 121  Triglycerides:  40  HDL: 65  LDL: 48  ALT: 32    VITAL SIGNS:  BP: 158/80  Pulse: 50  Weight: 168 lbs (stable)  BMI: 24      IMPRESSION AND PLAN:    Permanent Atrial Fibrillation  -some underlying asymptomatic bradycardia not on any negative chronotropic agents  -denies any symptoms of bradycardia  -chronic Eliquis (CHADS2-Vasc score: 5)    S/p CVA (10/2019)  -treated with tPA  -no residual deficits  -moderate left ICA disease followed by Dr. Starr    Coronary Artery Disease  -presumed remote silent MI  -anteroseptal regional wall motion abnormalities  -denies angina  -continue medical management    Hypertension:  -discussed adding Lisinopril; however, he deferred  -return for follow up with Dr. Arndt in 3-4 months  -he is to call with persistent BPs > 140 mmHg on a regular basis     Mild Ischemic Cardiomyopathy  -LVEF 50%  -no signs and symptoms of heart failure  -weight stable    Hyperlipidemia:  -on Atorvastatin 40 mg  -LDL 40    The total time for the visit today was 30 minutes which includes patient visit, reviewing of records, discussion, and placing of orders of the outpatient coordination of cardiovascular care as described.  The level of medical decision making during this visit was of moderate complexity.  Thank you for allowing me to participate in their care.      Orders Placed This Encounter   Procedures     Follow-Up with Cardiologist       No orders of the defined types were placed in this encounter.      There are no discontinued medications.      Encounter Diagnoses   Name Primary?     Atrial fibrillation, new onset (H) Yes     Benign essential hypertension        CURRENT MEDICATIONS:  Current Outpatient Medications   Medication Sig Dispense Refill      apixaban ANTICOAGULANT (ELIQUIS) 5 MG tablet Take 1 tablet (5 mg) by mouth 2 times daily Appointment required for further refills 180 tablet 0     atorvastatin (LIPITOR) 40 MG tablet Take 1 tablet (40 mg) by mouth daily 90 tablet 0     FIBER PO        multivitamin w/minerals (MULTI-VITAMIN) tablet Take 1 tablet by mouth daily       Omega-3 Fatty Acids (FISH OIL PO)          ALLERGIES     Allergies   Allergen Reactions     Pcn [Penicillins]        PAST MEDICAL HISTORY:  Past Medical History:   Diagnosis Date     Atrial fibrillation, new onset (H) 10/8/2019     Cerebral infarction (H)      Uncomplicated asthma        PAST SURGICAL HISTORY:  Past Surgical History:   Procedure Laterality Date     COLONOSCOPY  4/30/2014    Procedure: COMBINED COLONOSCOPY, SINGLE BIOPSY/POLYPECTOMY BY BIOPSY;  Surgeon: Eros Licona MD;  Location:  GI       FAMILY HISTORY:  Family History   Problem Relation Age of Onset     Cancer - colorectal Father        SOCIAL HISTORY:  Social History     Socioeconomic History     Marital status:      Spouse name: None     Number of children: None     Years of education: None     Highest education level: None   Occupational History     None   Tobacco Use     Smoking status: Never Smoker     Smokeless tobacco: Never Used   Substance and Sexual Activity     Alcohol use: Yes     Comment: 3-4 times a week     Drug use: No     Sexual activity: None   Other Topics Concern     Parent/sibling w/ CABG, MI or angioplasty before 65F 55M? Not Asked   Social History Narrative     None     Social Determinants of Health     Financial Resource Strain:      Difficulty of Paying Living Expenses:    Food Insecurity:      Worried About Running Out of Food in the Last Year:      Ran Out of Food in the Last Year:    Transportation Needs:      Lack of Transportation (Medical):      Lack of Transportation (Non-Medical):    Physical Activity:      Days of Exercise per Week:      Minutes of Exercise per  "Session:    Stress:      Feeling of Stress :    Social Connections:      Frequency of Communication with Friends and Family:      Frequency of Social Gatherings with Friends and Family:      Attends Jainism Services:      Active Member of Clubs or Organizations:      Attends Club or Organization Meetings:      Marital Status:    Intimate Partner Violence:      Fear of Current or Ex-Partner:      Emotionally Abused:      Physically Abused:      Sexually Abused:        Review of Systems:  Skin:  Negative       Eyes:  Negative      ENT:  Negative      Respiratory:  Positive for shortness of breath;wheezing;cough cough improving, wheezing comes and goes   Cardiovascular:  Negative Positive for light swelling in the ankles per wife  Gastroenterology: Negative      Genitourinary:  Positive for urinary frequency    Musculoskeletal:  Negative      Neurologic:  Negative      Psychiatric:  Negative      Heme/Lymph/Imm:  Negative      Endocrine:  Negative        Physical Exam:  Vitals: BP (!) 158/80 (BP Location: Right arm, Patient Position: Sitting, Cuff Size: Adult Regular)   Pulse 50   Ht 1.753 m (5' 9\")   Wt 76.2 kg (168 lb 1.6 oz)   SpO2 98%   BMI 24.82 kg/m      Constitutional:  cooperative        Skin:  warm and dry to the touch          Head:  normocephalic        Eyes:  pupils equal and round        Lymph:      ENT:           Neck:  JVP normal   faint L carotid bruit    Respiratory:  clear to auscultation         Cardiac:   irregularly irregular rhythm;bradycardic              pulses full and equal                                        GI:  abdomen soft        Extremities and Muscular Skeletal:  no edema              Neurological:  no gross motor deficits        Psych:  Alert and Oriented x 3          CC  No referring provider defined for this encounter.                      Thank you for allowing me to participate in the care of your patient.      Sincerely,     LIZZIE Escamilla [facility] " [facility]  cc:   No referring provider defined for this encounter.

## 2021-10-24 ENCOUNTER — HEALTH MAINTENANCE LETTER (OUTPATIENT)
Age: 86
End: 2021-10-24

## 2021-11-03 DIAGNOSIS — I48.91 ATRIAL FIBRILLATION, NEW ONSET (H): ICD-10-CM

## 2021-11-05 DIAGNOSIS — I63.9 CEREBROVASCULAR ACCIDENT (CVA), UNSPECIFIED MECHANISM (H): ICD-10-CM

## 2021-11-05 RX ORDER — ATORVASTATIN CALCIUM 40 MG/1
40 TABLET, FILM COATED ORAL DAILY
Qty: 90 TABLET | Refills: 3 | Status: SHIPPED | OUTPATIENT
Start: 2021-11-05 | End: 2023-12-05

## 2021-11-05 NOTE — TELEPHONE ENCOUNTER
Medication Refilled: Atorvastatin   Last office visit: 8/10/2021 with Shefali Hanley NP  Last Labs/EK/10/21 FLP  Next office visit: 2022 with Dr. Arndt  Pharmacy sent to: [facility] Yury Vora RN

## 2022-01-12 ENCOUNTER — HOSPITAL ENCOUNTER (OUTPATIENT)
Dept: ULTRASOUND IMAGING | Facility: CLINIC | Age: 87
Discharge: HOME | End: 2022-01-12
Attending: SURGERY | Admitting: SURGERY
Payer: COMMERCIAL

## 2022-01-12 ENCOUNTER — OFFICE VISIT (OUTPATIENT)
Dept: SURGERY | Facility: CLINIC | Age: 87
End: 2022-01-12
Attending: SURGERY
Payer: COMMERCIAL

## 2022-01-12 VITALS
HEART RATE: 55 BPM | BODY MASS INDEX: 24.88 KG/M2 | SYSTOLIC BLOOD PRESSURE: 134 MMHG | HEIGHT: 69 IN | WEIGHT: 168 LBS | DIASTOLIC BLOOD PRESSURE: 86 MMHG | OXYGEN SATURATION: 98 % | RESPIRATION RATE: 16 BRPM

## 2022-01-12 DIAGNOSIS — I65.22 ASYMPTOMATIC STENOSIS OF LEFT CAROTID ARTERY: Primary | ICD-10-CM

## 2022-01-12 DIAGNOSIS — I65.22 LEFT CAROTID STENOSIS: ICD-10-CM

## 2022-01-12 PROCEDURE — 99213 OFFICE O/P EST LOW 20 MIN: CPT | Performed by: SURGERY

## 2022-01-12 PROCEDURE — 93882 EXTRACRANIAL UNI/LTD STUDY: CPT | Mod: LT

## 2022-01-12 RX ORDER — PREDNISONE 20 MG/1
TABLET ORAL
COMMUNITY
Start: 2022-01-07 | End: 2022-01-17

## 2022-01-12 RX ORDER — ALBUTEROL SULFATE 90 UG/1
AEROSOL, METERED RESPIRATORY (INHALATION)
COMMUNITY
Start: 2021-12-16

## 2022-01-12 ASSESSMENT — MIFFLIN-ST. JEOR: SCORE: 1432.42

## 2022-01-12 NOTE — PROGRESS NOTES
Tu Barraza is an 86-year-old gentleman who is followed for an asymptomatic 50-69% left carotid stenosis.  He was hospitalized in October 2019 for bilateral infarcts with newly diagnosed atrial fibrillation.  He remains on Eliquis.  He presents today for annual bilateral carotid ultrasound.  He is accompanied today by his wife.  Over the past year he denies stroke, symptoms of TIA, or amaurosis.    Exam:  Well-developed male alert and oriented x3.  Blood pressure 134/86 with a pulse of 55.  Normal range of motion in the neck.  2+ palpable radial pulses bilaterally.  Neurologic exam nonfocal.    Imaging:  BILATERAL CAROTID ULTRASOUND   1/12/2022 1:54 PM      HISTORY: History of left carotid artery stenosis; Left carotid  stenosis.     COMPARISON: January 11, 2021     LEFT CAROTID FINDINGS:  There is mild to moderate mixed  atherosclerotic plaque at the carotid bifurcation and proximal  internal carotid artery.  Left ICA PSV:  138 cm/sec.  Left ICA EDV:  28 cm/sec.  Left ICA/CCA PSV Ratio:  1.9    These indicate  50 - 69%  diameter stenosis of the left ICA.    Left Vertebral: Antegrade flow.   Left ECA: Antegrade flow.      Causes of Decreased Accuracy:   None.                                                                       IMPRESSION:    50-69% diameter stenosis of the left ICA relative to the distal ICA  diameter.     EDGAR FONTANA MD      ASSESSMENT:  Stable, asymptomatic 50-69% left ICA stenosis not yet in need of repair.    RECOMMENDATION:  I reviewed all the above with Jose.  He will continue his medical regimen including daily Eliquis.  Vascular surgical follow-up will be with me in 2 years for repeat bilateral carotid ultrasound.    Total length of this encounter was 20 minutes.    Thanh Starr MD

## 2022-01-20 DIAGNOSIS — I65.22 LEFT CAROTID STENOSIS: Primary | ICD-10-CM

## 2022-01-24 DIAGNOSIS — I48.91 ATRIAL FIBRILLATION, NEW ONSET (H): ICD-10-CM

## 2022-01-25 ENCOUNTER — OFFICE VISIT (OUTPATIENT)
Dept: CARDIOLOGY | Facility: CLINIC | Age: 87
End: 2022-01-25
Payer: COMMERCIAL

## 2022-01-25 VITALS
BODY MASS INDEX: 25.77 KG/M2 | HEIGHT: 70 IN | HEART RATE: 52 BPM | SYSTOLIC BLOOD PRESSURE: 155 MMHG | DIASTOLIC BLOOD PRESSURE: 75 MMHG | WEIGHT: 180 LBS

## 2022-01-25 DIAGNOSIS — I10 BENIGN ESSENTIAL HYPERTENSION: ICD-10-CM

## 2022-01-25 PROCEDURE — 99214 OFFICE O/P EST MOD 30 MIN: CPT | Performed by: INTERNAL MEDICINE

## 2022-01-25 ASSESSMENT — MIFFLIN-ST. JEOR: SCORE: 1502.72

## 2022-01-25 NOTE — PROGRESS NOTES
CARDIOLOGY CLINIC VISIT  DATE OF SERVICE:  January 25, 2022        PRIMARY CARE PHYSICIAN:  El Mejia    HISTORY OF PRESENT ILLNESS:  Mr. Barraza is a healthy 86-year-old with PMH significant for atrial fibrillation, embolic CVA who presents to clinic today for follow up.  He was last seen by Shefali Hanley CNP in 8/2021.      In brief review, Mr Barraza suffered a stroke (10/2019) and found to have A-fib with a slow ventricular response, and cardiomyopathy with an anteroseptal wall motion abnormality.  Imaging revealed diffuse restrictions bihemispheric consistent with cardioembolic source. He was treated with tPA.  He was also found to have 70% proximal left internal carotid artery disease.  His ECHO showed LVEF 45-50% with anteroseptal and mid inferoseptal hypokinesis, normal RV function, negative bubble study, and no significant valvular pathology.  EKG showed anterior Q-waves.  Therefore it was presumed that he previously had suffered a silent myocardial infarction.  He was treated medically.  No beta-blocker was started due to underlying bradycardia.  He was placed on Eliquis and a statin     ECHO (11/2019) showed LVEF 50% with mid/distal anteroseptal and apical hypokinesis, normal RV function, 1+ TR, RVSP 41 mmHg     Holter (11/2019) showed A-fib with an average HR of 58 bpm and a 2% PVC burden.  There was no evidence of chronotropic incompetence.     Carotid ultrasound (1/2020 and 1/2021) both showed < 50% right internal carotid artery disease and 50-69% left internal carotid artery disease. Followed by Dr Starr.    In follow up today, Mr. Barraza reports feeling well overall.  He does note some lower extremity edema and 7 pound weight gain over just a 2 day period around [place].  He though this was quite unusual.  He is otherwise very active for his age and denies any exertional chest pain, chest discomfort or shortness of breath.  He is leaving to travel down to [place] [place]  He is looking forward to this trip.  Overall, he tells me he feels \"great\" and is doing all of the things he wants to be doing without limitations.  His BP is elevated in clinic today, reports it is otherwise at goal.          PAST MEDICAL HISTORY:  1.  Atrial fibrillation  2.  Asthma  3.  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  Hyperlipidemia    MEDICATIONS:  Current Outpatient Medications   Medication     albuterol (PROAIR HFA/PROVENTIL HFA/VENTOLIN HFA) 108 (90 Base) MCG/ACT inhaler     apixaban ANTICOAGULANT (ELIQUIS) 5 MG tablet     atorvastatin (LIPITOR) 40 MG tablet     FIBER PO     multivitamin w/minerals (MULTI-VITAMIN) tablet     Omega-3 Fatty Acids (FISH OIL PO)     No current facility-administered medications for this visit.       ALLERGIES:  Allergies   Allergen Reactions     Pcn [Penicillins]        REVIEW OF SYSTEMS:  A complete ROS was obtained and the pertinent positives are outlined in the history of present illness above.  The remainder of systems is negative.      PHYSICAL EXAM:                     Vital Signs with Ranges     0 lbs 0 oz    Constitutional: awake, alert, no distress  Eyes: PERRL, sclera nonicteric  ENT: trachea midline  Respiratory: CTAB  Cardiovascular:  Irregularly irregular no m/r/g, no JVD  GI: nondistended, nontender, bowel sounds present  Lymph/Hematologic: no lymphadenopathy  Skin: dry, no rash  Musculoskeletal: good muscle tone, +1 edema bilaterally  Neurologic: no focal deficits  Neuropsychiatric: appropriate affact    DATA:  Reviewed in Epic    Echocardiogram:  1. The left ventricle is normal in size. The visual ejection fraction is  estimated at 50%. Mid-distal anteroseptal and apical hypokinesis.  2. The right ventricle is normal in structure, function and size.  3. There is mild (1+) tricuspid regurgitation. The right ventricular systolic  pressure is approximated at 41mmHg plus the right atrial pressure.  When directly compared to " echo from 10-8-19 the EF and wall motion appear  similar.        ASSESSMENT:  1.  Atrial fibrillation with controlled ventricular response:  Asymptomatic  2.  Cardioembolic CVA in 10/2019  3.  Mild LV dysfunction  4.  Coronary artery disease:  By wall motion abnormalities on echocardiogram and Q waves anteriorly.  Previously discussed further risk stratification with nuclear stress test, however, as he is asymptomatic, he preferred to continue with medical management.    5.  Carotid artery disease:  70% left internal carotid artery stenosis; followed by vascular surgery  6.  Hypertension:  Not at goal; although reports improved BP's at home.    7.  Lower extremity edema:  Dependent edema, No other s/s of heart failure    RECOMMENDATIONS:  -Discussed use of compression stockings for lower extremity edema; particularly while traveling  -Continue to monitor BP; if elevated at follow up visit would recommend antihypertensive treatment  -Echocardiogram to reassess LV function (he would like to defer until he returns from his trip in April)  -Follow up in 6 months     Olga Arndt MD [facility]  Cardiology - [facility]  January 25, 2022

## 2022-01-25 NOTE — PATIENT INSTRUCTIONS
-Continue current medications  -Schedule echocardiogram in April after you return from you trip  -Follow with Dr. Arndt in 6 months

## 2022-01-25 NOTE — LETTER
1/25/2022    El Mejia MD  [address] [place] [address]    RE: Tu Barraza       Dear Colleague,     I had the pleasure of seeing Tu Barraza in the [facility].  CARDIOLOGY CLINIC VISIT  DATE OF SERVICE:  January 25, 2022        PRIMARY CARE PHYSICIAN:  El Mejia    HISTORY OF PRESENT ILLNESS:  Mr. Barraza is a healthy 86-year-old with PMH significant for atrial fibrillation, embolic CVA who presents to clinic today for follow up.  He was last seen by Shefali Hanley CNP in 8/2021.      In brief review, Mr Barraza suffered a stroke (10/2019) and found to have A-fib with a slow ventricular response, and cardiomyopathy with an anteroseptal wall motion abnormality.  Imaging revealed diffuse restrictions bihemispheric consistent with cardioembolic source. He was treated with tPA.  He was also found to have 70% proximal left internal carotid artery disease.  His ECHO showed LVEF 45-50% with anteroseptal and mid inferoseptal hypokinesis, normal RV function, negative bubble study, and no significant valvular pathology.  EKG showed anterior Q-waves.  Therefore it was presumed that he previously had suffered a silent myocardial infarction.  He was treated medically.  No beta-blocker was started due to underlying bradycardia.  He was placed on Eliquis and a statin     ECHO (11/2019) showed LVEF 50% with mid/distal anteroseptal and apical hypokinesis, normal RV function, 1+ TR, RVSP 41 mmHg     Holter (11/2019) showed A-fib with an average HR of 58 bpm and a 2% PVC burden.  There was no evidence of chronotropic incompetence.     Carotid ultrasound (1/2020 and 1/2021) both showed < 50% right internal carotid artery disease and 50-69% left internal carotid artery disease. Followed by Dr Starr.    In follow up today, Mr. Barraza reports feeling well overall.  He does note some lower extremity edema and 7 pound weight gain over just a 2 day period around [place].  He though this was quite  "unusual.  He is otherwise very active for his age and denies any exertional chest pain, chest discomfort or shortness of breath.  [place]  He is looking forward to this trip.  Overall, he tells me he feels \"great\" and is doing all of the things he wants to be doing without limitations.  His BP is elevated in clinic today, reports it is otherwise at goal.          PAST MEDICAL HISTORY:  1.  Atrial fibrillation  2.  Asthma  3.  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  Hyperlipidemia    MEDICATIONS:  Current Outpatient Medications   Medication     albuterol (PROAIR HFA/PROVENTIL HFA/VENTOLIN HFA) 108 (90 Base) MCG/ACT inhaler     apixaban ANTICOAGULANT (ELIQUIS) 5 MG tablet     atorvastatin (LIPITOR) 40 MG tablet     FIBER PO     multivitamin w/minerals (MULTI-VITAMIN) tablet     Omega-3 Fatty Acids (FISH OIL PO)     No current facility-administered medications for this visit.       ALLERGIES:  Allergies   Allergen Reactions     Pcn [Penicillins]        REVIEW OF SYSTEMS:  A complete ROS was obtained and the pertinent positives are outlined in the history of present illness above.  The remainder of systems is negative.      PHYSICAL EXAM:                     Vital Signs with Ranges     0 lbs 0 oz    Constitutional: awake, alert, no distress  Eyes: PERRL, sclera nonicteric  ENT: trachea midline  Respiratory: CTAB  Cardiovascular:  Irregularly irregular no m/r/g, no JVD  GI: nondistended, nontender, bowel sounds present  Lymph/Hematologic: no lymphadenopathy  Skin: dry, no rash  Musculoskeletal: good muscle tone, +1 edema bilaterally  Neurologic: no focal deficits  Neuropsychiatric: appropriate affact    DATA:  Reviewed in Epic    Echocardiogram:  1. The left ventricle is normal in size. The visual ejection fraction is  estimated at 50%. Mid-distal anteroseptal and apical hypokinesis.  2. The right ventricle is normal in " structure, function and size.  3. There is mild (1+) tricuspid regurgitation. The right ventricular systolic  pressure is approximated at 41mmHg plus the right atrial pressure.  When directly compared to echo from 10-8-19 the EF and wall motion appear  similar.    ASSESSMENT:  1.  Atrial fibrillation with controlled ventricular response:  Asymptomatic  2.  Cardioembolic CVA in 10/2019  3.  Mild LV dysfunction  4.  Coronary artery disease:  By wall motion abnormalities on echocardiogram and Q waves anteriorly.  Previously discussed further risk stratification with nuclear stress test, however, as he is asymptomatic, he preferred to continue with medical management.    5.  Carotid artery disease:  70% left internal carotid artery stenosis; followed by vascular surgery  6.  Hypertension:  Not at goal; although reports improved BP's at home.    7.  Lower extremity edema:  Dependent edema, No other s/s of heart failure    RECOMMENDATIONS:  -Discussed use of compression stockings for lower extremity edema; particularly while traveling  -Continue to monitor BP; if elevated at follow up visit would recommend antihypertensive treatment  -Echocardiogram to reassess LV function (he would like to defer until he returns from his trip in April)  -Follow up in 6 months     Olga Arndt MD [facility]  Cardiology - [facility]  January 25, 2022    Thank you for allowing me to participate in the care of your patient.    Sincerely,     Olga Arndt MD   [facility]  cc:   LIZZIE Arreguin CNP  [address]  [place] [address]

## 2022-02-13 ENCOUNTER — HEALTH MAINTENANCE LETTER (OUTPATIENT)
Age: 87
End: 2022-02-13

## 2022-04-25 ENCOUNTER — HOSPITAL ENCOUNTER (OUTPATIENT)
Dept: CARDIOLOGY | Facility: CLINIC | Age: 87
Discharge: HOME | End: 2022-04-25
Attending: INTERNAL MEDICINE | Admitting: INTERNAL MEDICINE
Payer: COMMERCIAL

## 2022-04-25 DIAGNOSIS — I10 BENIGN ESSENTIAL HYPERTENSION: ICD-10-CM

## 2022-04-25 LAB — LVEF ECHO: NORMAL

## 2022-04-25 PROCEDURE — 93306 TTE W/DOPPLER COMPLETE: CPT | Mod: 26 | Performed by: INTERNAL MEDICINE

## 2022-04-25 PROCEDURE — 93306 TTE W/DOPPLER COMPLETE: CPT

## 2022-10-11 ENCOUNTER — OFFICE VISIT (OUTPATIENT)
Dept: CARDIOLOGY | Facility: CLINIC | Age: 87
End: 2022-10-11
Payer: COMMERCIAL

## 2022-10-11 VITALS
HEIGHT: 70 IN | HEART RATE: 62 BPM | SYSTOLIC BLOOD PRESSURE: 124 MMHG | DIASTOLIC BLOOD PRESSURE: 81 MMHG | OXYGEN SATURATION: 99 % | WEIGHT: 163 LBS | BODY MASS INDEX: 23.34 KG/M2

## 2022-10-11 DIAGNOSIS — I10 BENIGN ESSENTIAL HYPERTENSION: ICD-10-CM

## 2022-10-11 PROCEDURE — 99214 OFFICE O/P EST MOD 30 MIN: CPT | Performed by: INTERNAL MEDICINE

## 2022-10-11 NOTE — PROGRESS NOTES
CARDIOLOGY CLINIC VISIT  DATE OF SERVICE:  October 11, 2022      PRIMARY CARE PHYSICIAN:  El Mejia    HISTORY OF PRESENT ILLNESS:  Mr. Barraza is an 87 year old gentleman with PMH significant for atrial fibrillation, embolic CVA who presents to clinic today for follow up.  He was last seen by me in 1/2022.       In brief review, Mr Barraza suffered a stroke (10/2019) and found to have A-fib with a slow ventricular response, and cardiomyopathy with an anteroseptal wall motion abnormality.  Imaging revealed diffuse restrictions bihemispheric consistent with cardioembolic source. He was treated with tPA.  He was also found to have 70% proximal left internal carotid artery disease.  His ECHO showed LVEF 45-50% with anteroseptal and mid inferoseptal hypokinesis, normal RV function, negative bubble study, and no significant valvular pathology.  EKG showed anterior Q-waves.  Therefore it was presumed that he previously had suffered a silent myocardial infarction.  He was treated medically.  No beta-blocker was started due to underlying bradycardia.  He was placed on Eliquis and a statin     ECHO (11/2019) showed LVEF 50% with mid/distal anteroseptal and apical hypokinesis, normal RV function, 1+ TR, RVSP 41 mmHg     Holter (11/2019) showed A-fib with an average HR of 58 bpm and a 2% PVC burden.  There was no evidence of chronotropic incompetence.     Carotid ultrasound (1/2020 and 1/2021) both showed < 50% right internal carotid artery disease and 50-69% left internal carotid artery disease. Followed by Dr Starr.    In follow-up today, already reports feeling well overall.  He denies any exertional chest pain, chest discomfort or shortness of breath.  He denies any heart palpitations or racing heart.  He still enjoys golfing 18 holes about 3 days a week.  He is in the process of moving from a townhome to an apartment with his wife.  He states he is going through 30 years of stuff in preparation for the downsizing and  moved.  He has noted 2 episodes of double vision that have occurred in the past month or so which lasts just about a minute or so and have since resolved.  He has not had other associated symptoms.  He is tolerating his Eliquis without difficulty.            PAST MEDICAL HISTORY:  1.  Atrial fibrillation  2.  Asthma  3.  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  Hyperlipidemia    MEDICATIONS:  Current Outpatient Medications   Medication     albuterol (PROAIR HFA/PROVENTIL HFA/VENTOLIN HFA) 108 (90 Base) MCG/ACT inhaler     apixaban ANTICOAGULANT (ELIQUIS) 5 MG tablet     atorvastatin (LIPITOR) 40 MG tablet     FIBER PO     multivitamin w/minerals (MULTI-VITAMIN) tablet     Omega-3 Fatty Acids (FISH OIL PO)     No current facility-administered medications for this visit.       ALLERGIES:  Allergies   Allergen Reactions     Pcn [Penicillins]        REVIEW OF SYSTEMS:  A complete ROS was obtained and the pertinent positives are outlined in the history of present illness above.  The remainder of systems is negative.      PHYSICAL EXAM:                     Vital Signs with Ranges     0 lbs 0 oz    Constitutional: awake, alert, no distress  Eyes: PERRL, sclera nonicteric  ENT: trachea midline  Respiratory: CTAB  Cardiovascular:  Irregularly irregular no m/r/g, no JVD  GI: nondistended, nontender, bowel sounds present  Lymph/Hematologic: no lymphadenopathy  Skin: dry, no rash  Musculoskeletal: good muscle tone, +1 edema bilaterally  Neurologic: no focal deficits  Neuropsychiatric: appropriate affact    DATA:  Reviewed in Epic    Echocardiogram dated 4/25/22 images reviewed personally  The visual ejection fraction is 50-55%.  Left ventricular systolic function is borderline reduced.  There are regional wall motion abnormalities as specified.  There is trace aortic regurgitation.  The ascending aorta is Borderline dilated.  The rhythm was atrial fibrillation.  On direct comparison with the echo from 11/14/2019, the apical wall  motion  abnormality is not seen on this study. The septal wall motion abnormalities  are similar to before.  ______________________________________________________________________________            ASSESSMENT:  1.  Atrial fibrillation with controlled ventricular response:  Asymptomatic  2.  Cardioembolic CVA in 10/2019  3.  Mild LV dysfunction  4.  Coronary artery disease:  By wall motion abnormalities on echocardiogram and Q waves anteriorly.  Previously discussed further risk stratification with nuclear stress test, however, as he is asymptomatic, he preferred to continue with medical management.    5.  Carotid artery disease:  70% left internal carotid artery stenosis; followed by vascular surgery  6.  Hypertension: At goal    RECOMMENDATIONS:  1.  Continue current medications, no changes made today.  2.  If he has further episodes of transient double vision, he should follow-up with Dr. Starr with repeat carotid ultrasound.  3.  Plan for follow-up in 12 months or before then as necessary.      Olga Arndt MD [facility]  Cardiology - [facility]  October 11, 2022

## 2022-10-11 NOTE — PATIENT INSTRUCTIONS
-Continue current medications, no changes made otday  -Follow up in 12 months with Dr. Arndt

## 2022-10-11 NOTE — LETTER
10/11/2022    El Mejia MD  [address] [place] [address]    RE: Tu Barraza       Dear Colleague,     I had the pleasure of seeing Tu Barraza in the [facility].  CARDIOLOGY CLINIC VISIT  DATE OF SERVICE:  October 11, 2022      PRIMARY CARE PHYSICIAN:  El Mejia    HISTORY OF PRESENT ILLNESS:  Mr. Barraza is an 87 year old gentleman with PMH significant for atrial fibrillation, embolic CVA who presents to clinic today for follow up.  He was last seen by me in 1/2022.       In brief review, Mr Barraza suffered a stroke (10/2019) and found to have A-fib with a slow ventricular response, and cardiomyopathy with an anteroseptal wall motion abnormality.  Imaging revealed diffuse restrictions bihemispheric consistent with cardioembolic source. He was treated with tPA.  He was also found to have 70% proximal left internal carotid artery disease.  His ECHO showed LVEF 45-50% with anteroseptal and mid inferoseptal hypokinesis, normal RV function, negative bubble study, and no significant valvular pathology.  EKG showed anterior Q-waves.  Therefore it was presumed that he previously had suffered a silent myocardial infarction.  He was treated medically.  No beta-blocker was started due to underlying bradycardia.  He was placed on Eliquis and a statin     ECHO (11/2019) showed LVEF 50% with mid/distal anteroseptal and apical hypokinesis, normal RV function, 1+ TR, RVSP 41 mmHg     Holter (11/2019) showed A-fib with an average HR of 58 bpm and a 2% PVC burden.  There was no evidence of chronotropic incompetence.     Carotid ultrasound (1/2020 and 1/2021) both showed < 50% right internal carotid artery disease and 50-69% left internal carotid artery disease. Followed by Dr Starr.    In follow-up today, already reports feeling well overall.  He denies any exertional chest pain, chest discomfort or shortness of breath.  He denies any heart palpitations or racing heart.  He still enjoys  golfing 18 holes about 3 days a week.  He is in the process of moving from a townhome to an apartment with his wife.  He states he is going through 30 years of stuff in preparation for the downsizing and moved.  He has noted 2 episodes of double vision that have occurred in the past month or so which lasts just about a minute or so and have since resolved.  He has not had other associated symptoms.  He is tolerating his Eliquis without difficulty.            PAST MEDICAL HISTORY:  1.  Atrial fibrillation  2.  Asthma  3.  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  Hyperlipidemia    MEDICATIONS:  Current Outpatient Medications   Medication     albuterol (PROAIR HFA/PROVENTIL HFA/VENTOLIN HFA) 108 (90 Base) MCG/ACT inhaler     apixaban ANTICOAGULANT (ELIQUIS) 5 MG tablet     atorvastatin (LIPITOR) 40 MG tablet     FIBER PO     multivitamin w/minerals (MULTI-VITAMIN) tablet     Omega-3 Fatty Acids (FISH OIL PO)     No current facility-administered medications for this visit.       ALLERGIES:  Allergies   Allergen Reactions     Pcn [Penicillins]        REVIEW OF SYSTEMS:  A complete ROS was obtained and the pertinent positives are outlined in the history of present illness above.  The remainder of systems is negative.      PHYSICAL EXAM:                     Vital Signs with Ranges     0 lbs 0 oz    Constitutional: awake, alert, no distress  Eyes: PERRL, sclera nonicteric  ENT: trachea midline  Respiratory: CTAB  Cardiovascular:  Irregularly irregular no m/r/g, no JVD  GI: nondistended, nontender, bowel sounds present  Lymph/Hematologic: no lymphadenopathy  Skin: dry, no rash  Musculoskeletal: good muscle tone, +1 edema bilaterally  Neurologic: no focal deficits  Neuropsychiatric: appropriate affact    DATA:  Reviewed in Epic    Echocardiogram dated 4/25/22 images reviewed personally  The visual ejection fraction is 50-55%.  Left ventricular systolic function is borderline reduced.  There are regional wall motion abnormalities as  specified.  There is trace aortic regurgitation.  The ascending aorta is Borderline dilated.  The rhythm was atrial fibrillation.  On direct comparison with the echo from 11/14/2019, the apical wall motion  abnormality is not seen on this study. The septal wall motion abnormalities  are similar to before.  ______________________________________________________________________________            ASSESSMENT:  1.  Atrial fibrillation with controlled ventricular response:  Asymptomatic  2.  Cardioembolic CVA in 10/2019  3.  Mild LV dysfunction  4.  Coronary artery disease:  By wall motion abnormalities on echocardiogram and Q waves anteriorly.  Previously discussed further risk stratification with nuclear stress test, however, as he is asymptomatic, he preferred to continue with medical management.    5.  Carotid artery disease:  70% left internal carotid artery stenosis; followed by vascular surgery  6.  Hypertension: At goal    RECOMMENDATIONS:  1.  Continue current medications, no changes made today.  2.  If he has further episodes of transient double vision, he should follow-up with Dr. Starr with repeat carotid ultrasound.  3.  Plan for follow-up in 12 months or before then as necessary.      Olga Arndt MD [facility]  Cardiology - [facility]  October 11, 2022      Thank you for allowing me to participate in the care of your patient.      Sincerely,     Olga Arndt MD     [facility]  cc:   Olga Arndt MD  [facility] [place]  [address]  [place] [id number]

## 2022-11-01 DIAGNOSIS — I48.91 ATRIAL FIBRILLATION, NEW ONSET (H): ICD-10-CM

## 2022-11-01 NOTE — TELEPHONE ENCOUNTER
Received refill request from [facility] pharmacy for Eliquis 5mg tab twice daily.    Last OV   10\11\22 Dr. Arndt    [facility] Cardiology Refill Guideline reviewed.  Medication meets criteria for refill.     Refill sent    Eugenia Cárdenas RN on 11/1/2022 at 11:50 AM

## 2023-03-26 ENCOUNTER — HEALTH MAINTENANCE LETTER (OUTPATIENT)
Age: 88
End: 2023-03-26

## 2023-12-05 ENCOUNTER — OFFICE VISIT (OUTPATIENT)
Dept: CARDIOLOGY | Facility: CLINIC | Age: 88
End: 2023-12-05
Payer: COMMERCIAL

## 2023-12-05 VITALS
HEIGHT: 70 IN | HEART RATE: 60 BPM | BODY MASS INDEX: 24.2 KG/M2 | OXYGEN SATURATION: 95 % | WEIGHT: 169 LBS | SYSTOLIC BLOOD PRESSURE: 149 MMHG | DIASTOLIC BLOOD PRESSURE: 73 MMHG

## 2023-12-05 DIAGNOSIS — I48.91 ATRIAL FIBRILLATION, UNSPECIFIED TYPE (H): Primary | ICD-10-CM

## 2023-12-05 DIAGNOSIS — I63.9 CEREBROVASCULAR ACCIDENT (CVA), UNSPECIFIED MECHANISM (H): ICD-10-CM

## 2023-12-05 PROCEDURE — 99214 OFFICE O/P EST MOD 30 MIN: CPT | Performed by: INTERNAL MEDICINE

## 2023-12-05 RX ORDER — ATORVASTATIN CALCIUM 40 MG/1
40 TABLET, FILM COATED ORAL DAILY
Qty: 90 TABLET | Refills: 3 | Status: SHIPPED | OUTPATIENT
Start: 2023-12-05

## 2023-12-05 RX ORDER — PREDNISONE 10 MG/1
10 TABLET ORAL DAILY
COMMUNITY

## 2023-12-05 NOTE — PATIENT INSTRUCTIONS
-Monitor BP at home; send Dr. Arndt readings in 2 weeks via [employer]  -Follow up with primary MD on the 18th; bring BP cuff to that appointment  -Follow up in one year with Dr. Arndt

## 2023-12-05 NOTE — LETTER
12/5/2023    El Mejia MD  [address] [place] [address]    RE: Tu Barraza       Dear Colleague,     I had the pleasure of seeing Tu Barraza in the [facility].  CARDIOLOGY CLINIC VISIT  DATE OF SERVICE:  December 5, 2023  PRIMARY CARE PHYSICIAN:  El Mejia    HISTORY OF PRESENT ILLNESS:  Mr. Barraza is an 88 year old gentleman with PMH significant for atrial fibrillation, embolic CVA who presents to clinic today for follow up.  He was last seen by me in October 2022.      In brief review, Mr Barraza suffered a stroke (10/2019) and found to have A-fib with a slow ventricular response, and cardiomyopathy with an anteroseptal wall motion abnormality.  Imaging revealed diffuse restrictions bihemispheric consistent with cardioembolic source. He was treated with tPA.  He was also found to have 70% proximal left internal carotid artery disease.  His ECHO showed LVEF 45-50% with anteroseptal and mid inferoseptal hypokinesis, normal RV function, negative bubble study, and no significant valvular pathology.  EKG showed anterior Q-waves.  Therefore it was presumed that he previously had suffered a silent myocardial infarction.  He was treated medically.  No beta-blocker was started due to underlying bradycardia.  He was placed on Eliquis and a statin    In follow up today,  Jose reports feeling well.  He remains very active and enjoys golfing regularly.  He denies any exertional chest pain, chest discomfort.  He does note shortness of breath which he feels is related to his asthma.    He denies any heart palpitations, light-headedness or dizziness.              PAST MEDICAL HISTORY:  1.  Atrial fibrillation  2.  Asthma  3.  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  Hyperlipidemia    MEDICATIONS:  Current Outpatient Medications   Medication    albuterol (PROAIR HFA/PROVENTIL HFA/VENTOLIN HFA) 108 (90 Base) MCG/ACT inhaler    apixaban ANTICOAGULANT (ELIQUIS) 5 MG tablet    atorvastatin (LIPITOR) 40 MG  tablet    FIBER PO    multivitamin w/minerals (THERA-VIT-M) tablet    Omega-3 Fatty Acids (FISH OIL PO)    predniSONE (DELTASONE) 10 MG tablet     No current facility-administered medications for this visit.       ALLERGIES:  Allergies   Allergen Reactions    Pcn [Penicillins]        REVIEW OF SYSTEMS:  A complete ROS was obtained and the pertinent positives are outlined in the history of present illness above.  The remainder of systems is negative.      PHYSICAL EXAM:                     Vital Signs with Ranges     169 lbs 0 oz    Constitutional: awake, alert, no distress  Eyes: PERRL, sclera nonicteric  ENT: trachea midline  Respiratory: Mild expiratory wheezes  Cardiovascular:  Irregularly irregular no m/r/g, no JVD  GI: nondistended, nontender, bowel sounds present  Lymph/Hematologic: no lymphadenopathy  Skin: dry, no rash  Musculoskeletal: good muscle tone, +1 edema bilaterally  Neurologic: no focal deficits  Neuropsychiatric: appropriate affact    DATA:  Reviewed in Epic    Echocardiogram dated 4/25/22 images reviewed personally  The visual ejection fraction is 50-55%.  Left ventricular systolic function is borderline reduced.  There are regional wall motion abnormalities as specified.  There is trace aortic regurgitation.  The ascending aorta is Borderline dilated.  The rhythm was atrial fibrillation.  On direct comparison with the echo from 11/14/2019, the apical wall motion  abnormality is not seen on this study. The septal wall motion abnormalities  are similar to before.  ______________________________________________________________________________            ASSESSMENT:  1.  Atrial fibrillation with controlled ventricular response:  Asymptomatic.  Tolerating apixaban.  Has not required AV node blockers.    2.  Cardioembolic CVA in 10/2019  3.  Mild LV dysfunction  4.  Coronary artery disease:  By wall motion abnormalities on echocardiogram and Q waves anteriorly.  Previously discussed further risk  stratification with nuclear stress test, however, as he is asymptomatic, he preferred to continue with medical management.    5.  Carotid artery disease:  70% left internal carotid artery stenosis; followed by vascular surgery  6.  Hypertension: Elevated, Jose reports BP's are better at home  7.  Asthma:  not optimally controlled.    RECOMMENDATIONS:  -Jose will check BP at home over next two weeks and contact my office with the readings.  He will follow up with his primary MD on the 18th and bring his BP cuff with him to that appointment  -Follow up with primary MD regarding asthma management  -Follow up in one year or before than as needed.     Olga Arndt MD [facility]  December 5, 2023      CARDIOLOGY CLINIC VISIT  DATE OF SERVICE:  October 11, 2022      PRIMARY CARE PHYSICIAN:  El Mejia    HISTORY OF PRESENT ILLNESS:  Mr. Barraza is an 87 year old gentleman with PMH significant for atrial fibrillation, embolic CVA who presents to clinic today for follow up.  He was last seen by me in 1/2022.       In brief review, Mr Barraza suffered a stroke (10/2019) and found to have A-fib with a slow ventricular response, and cardiomyopathy with an anteroseptal wall motion abnormality.  Imaging revealed diffuse restrictions bihemispheric consistent with cardioembolic source. He was treated with tPA.  He was also found to have 70% proximal left internal carotid artery disease.  His ECHO showed LVEF 45-50% with anteroseptal and mid inferoseptal hypokinesis, normal RV function, negative bubble study, and no significant valvular pathology.  EKG showed anterior Q-waves.  Therefore it was presumed that he previously had suffered a silent myocardial infarction.  He was treated medically.  No beta-blocker was started due to underlying bradycardia.  He was placed on Eliquis and a statin     ECHO (11/2019) showed LVEF 50% with mid/distal anteroseptal and apical hypokinesis, normal RV function, 1+ TR,  RVSP 41 mmHg     Holter (11/2019) showed A-fib with an average HR of 58 bpm and a 2% PVC burden.  There was no evidence of chronotropic incompetence.     Carotid ultrasound (1/2020 and 1/2021) both showed < 50% right internal carotid artery disease and 50-69% left internal carotid artery disease. Followed by Dr Starr.    In follow-up today, already reports feeling well overall.  He denies any exertional chest pain, chest discomfort or shortness of breath.  He denies any heart palpitations or racing heart.  He still enjoys golfing 18 holes about 3 days a week.  He is in the process of moving from a townhome to an apartment with his wife.  He states he is going through 30 years of stuff in preparation for the downsizing and moved.  He has noted 2 episodes of double vision that have occurred in the past month or so which lasts just about a minute or so and have since resolved.  He has not had other associated symptoms.  He is tolerating his Eliquis without difficulty.            PAST MEDICAL HISTORY:  1.  Atrial fibrillation  2.  Asthma  3.  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  Hyperlipidemia    MEDICATIONS:  Current Outpatient Medications   Medication    albuterol (PROAIR HFA/PROVENTIL HFA/VENTOLIN HFA) 108 (90 Base) MCG/ACT inhaler    apixaban ANTICOAGULANT (ELIQUIS) 5 MG tablet    atorvastatin (LIPITOR) 40 MG tablet    FIBER PO    multivitamin w/minerals (THERA-VIT-M) tablet    Omega-3 Fatty Acids (FISH OIL PO)    predniSONE (DELTASONE) 10 MG tablet     No current facility-administered medications for this visit.       ALLERGIES:  Allergies   Allergen Reactions    Pcn [Penicillins]        REVIEW OF SYSTEMS:  A complete ROS was obtained and the pertinent positives are outlined in the history of present illness above.  The remainder of systems is negative.      PHYSICAL EXAM:      BP: (!) 149/73 Pulse: 60     SpO2: 95 %      Vital Signs with Ranges  Pulse:  [60] 60  BP: (149)/(73) 149/73  SpO2:  [95 %] 95 %  169 lbs 0  oz    Constitutional: awake, alert, no distress  Eyes: PERRL, sclera nonicteric  ENT: trachea midline  Respiratory: CTAB  Cardiovascular:  Irregularly irregular no m/r/g, no JVD  GI: nondistended, nontender, bowel sounds present  Lymph/Hematologic: no lymphadenopathy  Skin: dry, no rash  Musculoskeletal: good muscle tone, +1 edema bilaterally  Neurologic: no focal deficits  Neuropsychiatric: appropriate affact    DATA:  Reviewed in Epic    Echocardiogram dated 4/25/22 images reviewed personally  The visual ejection fraction is 50-55%.  Left ventricular systolic function is borderline reduced.  There are regional wall motion abnormalities as specified.  There is trace aortic regurgitation.  The ascending aorta is Borderline dilated.  The rhythm was atrial fibrillation.  On direct comparison with the echo from 11/14/2019, the apical wall motion  abnormality is not seen on this study. The septal wall motion abnormalities  are similar to before.  ______________________________________________________________________________            ASSESSMENT:  1.  Atrial fibrillation with controlled ventricular response:  Asymptomatic  2.  Cardioembolic CVA in 10/2019  3.  Mild LV dysfunction  4.  Coronary artery disease:  By wall motion abnormalities on echocardiogram and Q waves anteriorly.  Previously discussed further risk stratification with nuclear stress test, however, as he is asymptomatic, he preferred to continue with medical management.    5.  Carotid artery disease:  70% left internal carotid artery stenosis; followed by vascular surgery  6.  Hypertension: At goal    RECOMMENDATIONS:  1.  Continue current medications, no changes made today.  2.  If he has further episodes of transient double vision, he should follow-up with Dr. Starr with repeat carotid ultrasound.  3.  Plan for follow-up in 12 months or before then as necessary.      Olga Arndt MD [facility]  Cardiology - [facility]  October 11, 2022      Thank you for  allowing me to participate in the care of your patient.      Sincerely,     Olga Arndt MD     [facility]  cc:   No referring provider defined for this encounter.

## 2023-12-05 NOTE — PROGRESS NOTES
CARDIOLOGY CLINIC VISIT  DATE OF SERVICE:  October 11, 2022      PRIMARY CARE PHYSICIAN:  El Mejia    HISTORY OF PRESENT ILLNESS:  Mr. Barraza is an 87 year old gentleman with PMH significant for atrial fibrillation, embolic CVA who presents to clinic today for follow up.  He was last seen by me in 1/2022.       In brief review, Mr Barraza suffered a stroke (10/2019) and found to have A-fib with a slow ventricular response, and cardiomyopathy with an anteroseptal wall motion abnormality.  Imaging revealed diffuse restrictions bihemispheric consistent with cardioembolic source. He was treated with tPA.  He was also found to have 70% proximal left internal carotid artery disease.  His ECHO showed LVEF 45-50% with anteroseptal and mid inferoseptal hypokinesis, normal RV function, negative bubble study, and no significant valvular pathology.  EKG showed anterior Q-waves.  Therefore it was presumed that he previously had suffered a silent myocardial infarction.  He was treated medically.  No beta-blocker was started due to underlying bradycardia.  He was placed on Eliquis and a statin     ECHO (11/2019) showed LVEF 50% with mid/distal anteroseptal and apical hypokinesis, normal RV function, 1+ TR, RVSP 41 mmHg     Holter (11/2019) showed A-fib with an average HR of 58 bpm and a 2% PVC burden.  There was no evidence of chronotropic incompetence.     Carotid ultrasound (1/2020 and 1/2021) both showed < 50% right internal carotid artery disease and 50-69% left internal carotid artery disease. Followed by Dr Starr.    In follow-up today, already reports feeling well overall.  He denies any exertional chest pain, chest discomfort or shortness of breath.  He denies any heart palpitations or racing heart.  He still enjoys golfing 18 holes about 3 days a week.  He is in the process of moving from a townhome to an apartment with his wife.  He states he is going through 30 years of stuff in preparation for the downsizing and  moved.  He has noted 2 episodes of double vision that have occurred in the past month or so which lasts just about a minute or so and have since resolved.  He has not had other associated symptoms.  He is tolerating his Eliquis without difficulty.            PAST MEDICAL HISTORY:  1.  Atrial fibrillation  2.  Asthma  3.  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  Hyperlipidemia    MEDICATIONS:  Current Outpatient Medications   Medication    albuterol (PROAIR HFA/PROVENTIL HFA/VENTOLIN HFA) 108 (90 Base) MCG/ACT inhaler    apixaban ANTICOAGULANT (ELIQUIS) 5 MG tablet    atorvastatin (LIPITOR) 40 MG tablet    FIBER PO    multivitamin w/minerals (THERA-VIT-M) tablet    Omega-3 Fatty Acids (FISH OIL PO)    predniSONE (DELTASONE) 10 MG tablet     No current facility-administered medications for this visit.       ALLERGIES:  Allergies   Allergen Reactions    Pcn [Penicillins]        REVIEW OF SYSTEMS:  A complete ROS was obtained and the pertinent positives are outlined in the history of present illness above.  The remainder of systems is negative.      PHYSICAL EXAM:      BP: (!) 149/73 Pulse: 60     SpO2: 95 %      Vital Signs with Ranges  Pulse:  [60] 60  BP: (149)/(73) 149/73  SpO2:  [95 %] 95 %  169 lbs 0 oz    Constitutional: awake, alert, no distress  Eyes: PERRL, sclera nonicteric  ENT: trachea midline  Respiratory: CTAB  Cardiovascular:  Irregularly irregular no m/r/g, no JVD  GI: nondistended, nontender, bowel sounds present  Lymph/Hematologic: no lymphadenopathy  Skin: dry, no rash  Musculoskeletal: good muscle tone, +1 edema bilaterally  Neurologic: no focal deficits  Neuropsychiatric: appropriate affact    DATA:  Reviewed in Epic    Echocardiogram dated 4/25/22 images reviewed personally  The visual ejection fraction is 50-55%.  Left ventricular systolic function is borderline reduced.  There are regional wall motion abnormalities as specified.  There is trace aortic regurgitation.  The ascending aorta is Borderline  dilated.  The rhythm was atrial fibrillation.  On direct comparison with the echo from 11/14/2019, the apical wall motion  abnormality is not seen on this study. The septal wall motion abnormalities  are similar to before.  ______________________________________________________________________________            ASSESSMENT:  1.  Atrial fibrillation with controlled ventricular response:  Asymptomatic  2.  Cardioembolic CVA in 10/2019  3.  Mild LV dysfunction  4.  Coronary artery disease:  By wall motion abnormalities on echocardiogram and Q waves anteriorly.  Previously discussed further risk stratification with nuclear stress test, however, as he is asymptomatic, he preferred to continue with medical management.    5.  Carotid artery disease:  70% left internal carotid artery stenosis; followed by vascular surgery  6.  Hypertension: At goal    RECOMMENDATIONS:  1.  Continue current medications, no changes made today.  2.  If he has further episodes of transient double vision, he should follow-up with Dr. Starr with repeat carotid ultrasound.  3.  Plan for follow-up in 12 months or before then as necessary.      Olga Arndt MD [facility]  Cardiology - [facility] Heart  October 11, 2022

## 2023-12-05 NOTE — PROGRESS NOTES
CARDIOLOGY CLINIC VISIT  DATE OF SERVICE:  December 5, 2023  PRIMARY CARE PHYSICIAN:  El Mejia    HISTORY OF PRESENT ILLNESS:  Mr. Barraza is an 88 year old gentleman with PMH significant for atrial fibrillation, embolic CVA who presents to clinic today for follow up.  He was last seen by me in October 2022.      In brief review, Mr Barraza suffered a stroke (10/2019) and found to have A-fib with a slow ventricular response, and cardiomyopathy with an anteroseptal wall motion abnormality.  Imaging revealed diffuse restrictions bihemispheric consistent with cardioembolic source. He was treated with tPA.  He was also found to have 70% proximal left internal carotid artery disease.  His ECHO showed LVEF 45-50% with anteroseptal and mid inferoseptal hypokinesis, normal RV function, negative bubble study, and no significant valvular pathology.  EKG showed anterior Q-waves.  Therefore it was presumed that he previously had suffered a silent myocardial infarction.  He was treated medically.  No beta-blocker was started due to underlying bradycardia.  He was placed on Eliquis and a statin    In follow up today,  Jose reports feeling well.  He remains very active and enjoys golfing regularly.  He denies any exertional chest pain, chest discomfort.  He does note shortness of breath which he feels is related to his asthma.    He denies any heart palpitations, light-headedness or dizziness.              PAST MEDICAL HISTORY:  1.  Atrial fibrillation  2.  Asthma  3.  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  Hyperlipidemia    MEDICATIONS:  Current Outpatient Medications   Medication    albuterol (PROAIR HFA/PROVENTIL HFA/VENTOLIN HFA) 108 (90 Base) MCG/ACT inhaler    apixaban ANTICOAGULANT (ELIQUIS) 5 MG tablet    atorvastatin (LIPITOR) 40 MG tablet    FIBER PO    multivitamin w/minerals (THERA-VIT-M) tablet    Omega-3 Fatty Acids (FISH OIL PO)    predniSONE (DELTASONE) 10 MG tablet     No current facility-administered medications for this  visit.       ALLERGIES:  Allergies   Allergen Reactions    Pcn [Penicillins]        REVIEW OF SYSTEMS:  A complete ROS was obtained and the pertinent positives are outlined in the history of present illness above.  The remainder of systems is negative.      PHYSICAL EXAM:                     Vital Signs with Ranges     169 lbs 0 oz    Constitutional: awake, alert, no distress  Eyes: PERRL, sclera nonicteric  ENT: trachea midline  Respiratory: Mild expiratory wheezes  Cardiovascular:  Irregularly irregular no m/r/g, no JVD  GI: nondistended, nontender, bowel sounds present  Lymph/Hematologic: no lymphadenopathy  Skin: dry, no rash  Musculoskeletal: good muscle tone, +1 edema bilaterally  Neurologic: no focal deficits  Neuropsychiatric: appropriate affact    DATA:  Reviewed in Epic    Echocardiogram dated 4/25/22 images reviewed personally  The visual ejection fraction is 50-55%.  Left ventricular systolic function is borderline reduced.  There are regional wall motion abnormalities as specified.  There is trace aortic regurgitation.  The ascending aorta is Borderline dilated.  The rhythm was atrial fibrillation.  On direct comparison with the echo from 11/14/2019, the apical wall motion  abnormality is not seen on this study. The septal wall motion abnormalities  are similar to before.  ______________________________________________________________________________            ASSESSMENT:  1.  Atrial fibrillation with controlled ventricular response:  Asymptomatic.  Tolerating apixaban.  Has not required AV node blockers.    2.  Cardioembolic CVA in 10/2019  3.  Mild LV dysfunction  4.  Coronary artery disease:  By wall motion abnormalities on echocardiogram and Q waves anteriorly.  Previously discussed further risk stratification with nuclear stress test, however, as he is asymptomatic, he preferred to continue with medical management.    5.  Carotid artery disease:  70% left internal carotid artery stenosis; followed  by vascular surgery  6.  Hypertension: Elevated, Jose reports BP's are better at home  7.  Asthma:  not optimally controlled.    RECOMMENDATIONS:  -Jose will check BP at home over next two weeks and contact my office with the readings.  He will follow up with his primary MD on the 18th and bring his BP cuff with him to that appointment  -Follow up with primary MD regarding asthma management  -Follow up in one year or before than as needed.     Olga Arndt MD [facility]  December 5, 2023

## 2023-12-17 ENCOUNTER — MYC MEDICAL ADVICE (OUTPATIENT)
Dept: CARDIOLOGY | Facility: CLINIC | Age: 88
End: 2023-12-17
Payer: COMMERCIAL

## 2023-12-20 NOTE — TELEPHONE ENCOUNTER
Olga Arndt MD  [facility] Heart Team 410 minutes ago (12:01 PM)     WENDY Garcia's BP's are mildly elevated and he would benefit from a BP lowering medication.  Recommend amlodipine 5 mg daily.  Continue to monitor BP's at home.  Olga Arndt MD       Mychart message sent.

## 2023-12-26 NOTE — TELEPHONE ENCOUNTER
Spoke with patient regarding Dr. Daniel recommendations below. Pt stated that he would like to think about the Amlodipine and look further into it as he is hesitant to start a new medication at this time. Pt continuing to monitor his BP and states it is still in the 130s/140s. He knows he could benefit from lower BP but needs time to think it over. Pt will update team 4 with his decision.

## 2024-05-31 DIAGNOSIS — I48.91 ATRIAL FIBRILLATION, UNSPECIFIED TYPE (H): ICD-10-CM

## 2024-06-01 ENCOUNTER — HEALTH MAINTENANCE LETTER (OUTPATIENT)
Age: 89
End: 2024-06-01

## 2025-01-02 DIAGNOSIS — I63.9 CEREBROVASCULAR ACCIDENT (CVA), UNSPECIFIED MECHANISM (H): ICD-10-CM

## 2025-01-02 RX ORDER — ATORVASTATIN CALCIUM 40 MG/1
40 TABLET, FILM COATED ORAL DAILY
Qty: 90 TABLET | Refills: 1 | Status: SHIPPED | OUTPATIENT
Start: 2025-01-02

## 2025-01-15 ENCOUNTER — MYC MEDICAL ADVICE (OUTPATIENT)
Dept: CARDIOLOGY | Facility: CLINIC | Age: OVER 89
End: 2025-01-15
Payer: COMMERCIAL

## 2025-01-15 NOTE — TELEPHONE ENCOUNTER
Spoke with patient regarding concern for recent lab work by PCP including BNP 2758, K 5.5. Spoke with patient and he overall has been feeling well, denies any shortness of breath or chest pain. He does feel as though his legs are more swollen than usual. Pt is currently at PCP office drawing repeat lab work. Pt requesting sooner appt to further discuss leg swelling. Will schedule sooner appt with SMITH.

## 2025-01-16 ENCOUNTER — HOSPITAL ENCOUNTER (OUTPATIENT)
Dept: CARDIOLOGY | Facility: CLINIC | Age: OVER 89
Discharge: HOME | End: 2025-01-16
Payer: COMMERCIAL

## 2025-01-16 ENCOUNTER — OFFICE VISIT (OUTPATIENT)
Dept: CARDIOLOGY | Facility: CLINIC | Age: OVER 89
End: 2025-01-16
Payer: COMMERCIAL

## 2025-01-16 ENCOUNTER — HOSPITAL ENCOUNTER (OUTPATIENT)
Dept: GENERAL RADIOLOGY | Facility: CLINIC | Age: OVER 89
Discharge: HOME | End: 2025-01-16
Payer: COMMERCIAL

## 2025-01-16 ENCOUNTER — TRANSCRIBE ORDERS (OUTPATIENT)
Dept: OTHER | Age: OVER 89
End: 2025-01-16

## 2025-01-16 ENCOUNTER — LAB (OUTPATIENT)
Dept: LAB | Facility: CLINIC | Age: OVER 89
End: 2025-01-16
Payer: COMMERCIAL

## 2025-01-16 VITALS
DIASTOLIC BLOOD PRESSURE: 92 MMHG | HEART RATE: 72 BPM | WEIGHT: 184.7 LBS | HEIGHT: 69 IN | BODY MASS INDEX: 27.36 KG/M2 | OXYGEN SATURATION: 98 % | SYSTOLIC BLOOD PRESSURE: 164 MMHG

## 2025-01-16 DIAGNOSIS — I48.20 CHRONIC ATRIAL FIBRILLATION (H): ICD-10-CM

## 2025-01-16 DIAGNOSIS — I42.9 CARDIOMYOPATHY, UNSPECIFIED TYPE (H): Primary | ICD-10-CM

## 2025-01-16 DIAGNOSIS — E87.70 HYPERVOLEMIA, UNSPECIFIED HYPERVOLEMIA TYPE: ICD-10-CM

## 2025-01-16 DIAGNOSIS — R60.0 BILATERAL LOWER EXTREMITY EDEMA: Primary | ICD-10-CM

## 2025-01-16 DIAGNOSIS — I42.9 CARDIOMYOPATHY, UNSPECIFIED TYPE (H): ICD-10-CM

## 2025-01-16 DIAGNOSIS — I50.33 ACUTE ON CHRONIC DIASTOLIC CONGESTIVE HEART FAILURE (H): ICD-10-CM

## 2025-01-16 LAB
ANION GAP SERPL CALCULATED.3IONS-SCNC: 10 MMOL/L (ref 7–15)
BUN SERPL-MCNC: 24 MG/DL (ref 8–23)
CALCIUM SERPL-MCNC: 9.7 MG/DL (ref 8.8–10.4)
CHLORIDE SERPL-SCNC: 106 MMOL/L (ref 98–107)
CREAT SERPL-MCNC: 1.37 MG/DL (ref 0.67–1.17)
EGFRCR SERPLBLD CKD-EPI 2021: 49 ML/MIN/1.73M2
GLUCOSE SERPL-MCNC: 93 MG/DL (ref 70–99)
HCO3 SERPL-SCNC: 25 MMOL/L (ref 22–29)
LVEF ECHO: NORMAL
POTASSIUM SERPL-SCNC: 5 MMOL/L (ref 3.4–5.3)
SODIUM SERPL-SCNC: 141 MMOL/L (ref 135–145)

## 2025-01-16 PROCEDURE — 71046 X-RAY EXAM CHEST 2 VIEWS: CPT

## 2025-01-16 PROCEDURE — 999N000208 ECHOCARDIOGRAM COMPLETE

## 2025-01-16 PROCEDURE — C8929 TTE W OR WO FOL WCON,DOPPLER: HCPCS

## 2025-01-16 PROCEDURE — 255N000002 HC RX 255 OP 636

## 2025-01-16 RX ORDER — FUROSEMIDE 20 MG/1
20 TABLET ORAL
Qty: 30 TABLET | Refills: 1 | Status: SHIPPED | OUTPATIENT
Start: 2025-01-16

## 2025-01-16 RX ADMIN — HUMAN ALBUMIN MICROSPHERES AND PERFLUTREN 3 ML: 10; .22 INJECTION, SOLUTION INTRAVENOUS at 13:30

## 2025-01-16 NOTE — PROGRESS NOTES
General Cardiology Clinic Progress Note  Tu Barraza MRN# [id number]   YOB: 1935 Age: 89 year old     Primary cardiologist: Dr. Garces    Reason for visit: New or worsening symptoms: Edema and abnormal cardiac labs      Assessment & Plan:  Cardiomyopathy with fluid retention  Start Lasix 20 mg PO BID  CXR 2-view today for fluid status  Discussed monitoring lower extremity edema and weight changes at home. He is aware that with addition of Lasix, it would be expected his weight and swelling will decrease, he should let us know if he is not noticing improvement in swelling  Repeat echocardiogram, as soon as possible considering previous TTE in 2022 demonstrating new regional wall motion abnormalities and borderline reduced LVEF- concern for worsening heart failure and moderate-severe CAD  BMP in 1 week to monitor electrolytes and renal function  Follow up in 1 month, earlier if new or worsening symptoms  Discussed that if he develops worsening shortness of breath, chest pain, or chest pressure/heaviness, he should be taken to the emergency department for evaluation  Hypertension  Could be related to acute fluid retention, will defer addition of antihypertensive until fluid status is optimized      Pertinent Detailed Problem List:  Cardiomyopathy  Was recently hospitalized on cruise ship in [facility], just got back from trip on 12/24/24 with diagnosis of pneumonia. Appears he was discharged with Imdur, but reports he is not taking it and thought it was only for a short course. Was also discharged on Lasix 40 mg PO BID, although patient's wife reports he only took it once a day. He was additionally discharged with a prednisone taper and PO Levofloxacin course.  ProBNP 1/10/25- 2753; increased from ProBNP during cruise ship hospitalization, which was in 900s per discharge papers  TTE on 4/25/2022- LVEF 50-55% No significant valvular disease Normal RV size and function Dilated LA Normal IVC , new  apical regional wall motion abnormalities with similar septal wall motion abnormalities compared to 11/14/19 (majority of anteroseptal wall akinetic, mid-distal inferoseptal wall is akinetic)    Atrial fibrillation  On Eliquis 5 mg BID  Hyperlipidemia  On atorvastatin 40 mg daily  Last lipid panel in 2023 within goal, has been maintained within goal for years  Acute lower extremity edema  1/15/2025- Bilateral LE US negative for DVT  History of CVA  Known bilateral carotid stenosis  50-69% left carotid stenosis 1/12/2022  <50% stenosis of right carotid 1/11/2022  Follows with Dr Starr in vascular  Hypertension   Per chart review, PCP recommended addition of amlodipine in 2023, patient did not want a new medication and this was not started  History of smoking  COPD  Follows with pulmonology, last OV 1/6/25  Asthma    HPI:  Tu Barraza is a pleasant 89 year old patient who presents today for leg swelling and recent abnormal labs. He reports he had labs done and a proBNP was elevated, when he researched this it showed he may be having a heart attack so he decided to come in sooner. He also notes having new bilateral lower extremity edema that has been present since his recent return from a cruise on 12/24/24. He was hospitalized on the cruise ship for pneumonia and brought the paperwork today. It appears he was diuresed and recommended to follow up with cardiology. His wife notes a significant weight increase prior to the trip that was not attributable to diet. He started wearing compression socks, which has helped a little. He monitors BP at home and reports it is usually SBP 140s without treatment. He has a history of afib and takes Eliquis for this. No exertional dyspnea or chest pain. He stays active and regularly goes to the gym without issues. Was able to walk the [place] without issues. He was mainly concerned with his recent labs showing he may be having a heart attack. Denies Syncope, Palpitations,  "Dizziness/lightheadedness, Dyspnea, Chest pain, and Chest tightness or heaviness. Denies family history of heart attack.      Elba Harding PA-C   Physician Assistant   [facility]     PAST SURGICAL HISTORY:   Past Surgical History:   Procedure Laterality Date    COLONOSCOPY  4/30/2014    Procedure: COMBINED COLONOSCOPY, SINGLE BIOPSY/POLYPECTOMY BY BIOPSY;  Surgeon: Eros Licona MD;  Location:  GI       FAMILY HISTORY:   Family History   Problem Relation Age of Onset    Cancer - colorectal Father        SOCIAL HISTORY:   Social History     Tobacco Use    Smoking status: Never    Smokeless tobacco: Never   Substance Use Topics    Alcohol use: Yes     Comment: occasionally           Objective:    Vitals: BP (!) 164/92 (BP Location: Right arm, Patient Position: Sitting)   Pulse 72   Ht 1.753 m (5' 9\")   Wt 83.8 kg (184 lb 11.2 oz)   SpO2 98%   BMI 27.28 kg/m      Physical Exam:  General: Awake and alert. No acute distress.  Skin: Warm and dry. Appropriate color. No lesions or rashes noted.  HEENT: Normocephalic and atraumatic. EOM intact. PERRL. Trachea midline. Right-sided JVD: none.  Cardiac: Normal S1 and S2, no murmurs, rubs, or gallops noted. Irregular rate, irregular rhythm  .  Lung: Regular work of breathing without accessory muscle use. Clear to auscultation bilaterally.  Abdomen: No distension.  Extremities: Bilateral lower extremity edema: 2+.  Neuro: A & O. No focal deficits noted. Moves all extremities appropriately.      CURRENT MEDICATIONS:  Current Outpatient Medications   Medication Sig Dispense Refill    albuterol (PROAIR HFA/PROVENTIL HFA/VENTOLIN HFA) 108 (90 Base) MCG/ACT inhaler       apixaban ANTICOAGULANT (ELIQUIS) 5 MG tablet Take 1 tablet (5 mg) by mouth 2 times daily 180 tablet 2    atorvastatin (LIPITOR) 40 MG tablet Take 1 tablet (40 mg) by mouth daily. 90 tablet 1    FIBER PO Take by mouth daily.      furosemide (LASIX) 20 MG tablet Take 1 tablet (20 mg) " by mouth 2 times daily. 30 tablet 1    MAGNESIUM PO Take by mouth daily. OTC      multivitamin w/minerals (THERA-VIT-M) tablet Take 1 tablet by mouth daily      predniSONE (DELTASONE) 10 MG tablet Take 10 mg by mouth daily (Patient not taking: Reported on 1/16/2025)         ALLERGIES:  Allergies   Allergen Reactions    Pcn [Penicillins]          PAST MEDICAL HISTORY:  Past Medical History:   Diagnosis Date    Atrial fibrillation, new onset (H) 10/8/2019    Cerebral infarction (H)     Uncomplicated asthma        PAST SURGICAL HISTORY:  Past Surgical History:   Procedure Laterality Date    COLONOSCOPY  4/30/2014    Procedure: COMBINED COLONOSCOPY, SINGLE BIOPSY/POLYPECTOMY BY BIOPSY;  Surgeon: Eros Licona MD;  Location:  GI       FAMILY HISTORY:  Family History   Problem Relation Age of Onset    Cancer - colorectal Father        SOCIAL HISTORY:  Social History     Socioeconomic History    Marital status:      Spouse name: None    Number of children: None    Years of education: None    Highest education level: None   Tobacco Use    Smoking status: Never    Smokeless tobacco: Never   Substance and Sexual Activity    Alcohol use: Yes     Comment: occasionally    Drug use: No     Social Drivers of Health     Financial Resource Strain: Low Risk  (9/23/2023)    [facility]    Financial Resource Strain     Difficulty of Paying Living Expenses: 3   Food Insecurity: No Food Insecurity (9/23/2023)    [facility]    Food Insecurity     Worried About Running Out of Food in the Last Year: 1   Transportation Needs: No Transportation Needs (9/23/2023)    Received from [facility], [facility]    Transportation Needs     Lack of Transportation (Medical): 1   Social  Connections: Socially Integrated (9/23/2023)    Received from [facility] 5 CUPS and some sugar [facility], [facility] [employer] and some sugar [facility]    Social Connections     Frequency of Communication with Friends and Family: 0   Housing Stability: Low Risk  (9/23/2023)    [facility] from [employer] [employer] and some sugar [facility], [facility] CU[employer] and some sugar [facility]    Housing Stability     Unable to Pay for Housing in the Last Year: 1       Today's clinic visit entailed:  40 minutes spent by me on the date of the encounter doing chart review, history and exam, documentation and further activities per the note.  The level of medical decision making during this visit was of high complexity.     The longitudinal plan of care for the diagnosis(es)/condition(s) as documented were addressed during this visit. Due to the added complexity in care, I will continue to support Tu Barraza in the subsequent management and with ongoing continuity of care.         Review of Systems:     Review of Systems:  Skin:  not assessed     Eyes:  not assessed    ENT:  not assessed    Respiratory:  Negative for shortness of breath, dyspnea on exertion, dyspnea at rest  Cardiovascular:  Negative for, palpitations, chest pain, syncope or near-syncope, dizziness, lightheadedness edema, Positive for  Gastroenterology: not assessed    Genitourinary:  not assessed    Musculoskeletal:  not assessed    Neurologic:  Negative    Psychiatric:  not assessed    Heme/Lymph/Imm:  Negative for fever, chills  Endocrine:  not assessed

## 2025-01-16 NOTE — LETTER
1/16/2025    El Mejia MD  [address] [place] [address]    RE: Tu Barraza       Dear Colleague,     I had the pleasure of seeing Tu Barraza in the [facility].      General Cardiology Clinic Progress Note  Tu Barraza MRN# [id number]   YOB: 1935 Age: 89 year old     Primary cardiologist: Dr. Garces    Reason for visit: New or worsening symptoms: Edema and abnormal cardiac labs      Assessment & Plan:  Cardiomyopathy with fluid retention  Start Lasix 20 mg PO BID  CXR 2-view today for fluid status  Discussed monitoring lower extremity edema and weight changes at home. He is aware that with addition of Lasix, it would be expected his weight and swelling will decrease, he should let us know if he is not noticing improvement in swelling  Repeat echocardiogram, as soon as possible considering previous TTE in 2022 demonstrating new regional wall motion abnormalities and borderline reduced LVEF- concern for worsening heart failure and moderate-severe CAD  BMP in 1 week to monitor electrolytes and renal function  Follow up in 1 month, earlier if new or worsening symptoms  Discussed that if he develops worsening shortness of breath, chest pain, or chest pressure/heaviness, he should be taken to the emergency department for evaluation  Hypertension  Could be related to acute fluid retention, will defer addition of antihypertensive until fluid status is optimized      Pertinent Detailed Problem List:  Cardiomyopathy  Was recently hospitalized on cruise ship in [facility], just got back from trip on 12/24/24 with diagnosis of pneumonia. Appears he was discharged with Imdur, but reports he is not taking it and thought it was only for a short course. Was also discharged on Lasix 40 mg PO BID, although patient's wife reports he only took it once a day. He was additionally discharged with a prednisone taper and PO Levofloxacin course.  ProBNP 1/10/25- 2753; increased from  ProBNP during cruise ship hospitalization, which was in 900s per discharge papers  TTE on 4/25/2022- LVEF 50-55% No significant valvular disease Normal RV size and function Dilated LA Normal IVC , new apical regional wall motion abnormalities with similar septal wall motion abnormalities compared to 11/14/19 (majority of anteroseptal wall akinetic, mid-distal inferoseptal wall is akinetic)    Atrial fibrillation  On Eliquis 5 mg BID  Hyperlipidemia  On atorvastatin 40 mg daily  Last lipid panel in 2023 within goal, has been maintained within goal for years  Acute lower extremity edema  1/15/2025- Bilateral LE US negative for DVT  History of CVA  Known bilateral carotid stenosis  50-69% left carotid stenosis 1/12/2022  <50% stenosis of right carotid 1/11/2022  Follows with Dr Starr in vascular  Hypertension   Per chart review, PCP recommended addition of amlodipine in 2023, patient did not want a new medication and this was not started  History of smoking  COPD  Follows with pulmonology, last OV 1/6/25  Asthma    HPI:  Tu Barraza is a pleasant 89 year old patient who presents today for leg swelling and recent abnormal labs. He reports he had labs done and a proBNP was elevated, when he researched this it showed he may be having a heart attack so he decided to come in sooner. He also notes having new bilateral lower extremity edema that has been present since his recent return from a cruise on 12/24/24. He was hospitalized on the cruise ship for pneumonia and brought the paperwork today. It appears he was diuresed and recommended to follow up with cardiology. His wife notes a significant weight increase prior to the trip that was not attributable to diet. He started wearing compression socks, which has helped a little. He monitors BP at home and reports it is usually SBP 140s without treatment. He has a history of afib and takes Eliquis for this. No exertional dyspnea or chest pain. He stays active and regularly  "goes to the gym without issues. Was able to walk the [place] without issues. He was mainly concerned with his recent labs showing he may be having a heart attack. Denies Syncope, Palpitations, Dizziness/lightheadedness, Dyspnea, Chest pain, and Chest tightness or heaviness. Denies family history of heart attack.      Elba Harding PA-C   Physician Assistant   [facility]     PAST SURGICAL HISTORY:   Past Surgical History:   Procedure Laterality Date     COLONOSCOPY  4/30/2014    Procedure: COMBINED COLONOSCOPY, SINGLE BIOPSY/POLYPECTOMY BY BIOPSY;  Surgeon: Eros Licona MD;  Location:  GI       FAMILY HISTORY:   Family History   Problem Relation Age of Onset     Cancer - colorectal Father        SOCIAL HISTORY:   Social History     Tobacco Use     Smoking status: Never     Smokeless tobacco: Never   Substance Use Topics     Alcohol use: Yes     Comment: occasionally           Objective:    Vitals: BP (!) 164/92 (BP Location: Right arm, Patient Position: Sitting)   Pulse 72   Ht 1.753 m (5' 9\")   Wt 83.8 kg (184 lb 11.2 oz)   SpO2 98%   BMI 27.28 kg/m      Physical Exam:  General: Awake and alert. No acute distress.  Skin: Warm and dry. Appropriate color. No lesions or rashes noted.  HEENT: Normocephalic and atraumatic. EOM intact. PERRL. Trachea midline. Right-sided JVD: none.  Cardiac: Normal S1 and S2, no murmurs, rubs, or gallops noted. Irregular rate, irregular rhythm  .  Lung: Regular work of breathing without accessory muscle use. Clear to auscultation bilaterally.  Abdomen: No distension.  Extremities: Bilateral lower extremity edema: 2+.  Neuro: A & O. No focal deficits noted. Moves all extremities appropriately.      CURRENT MEDICATIONS:  Current Outpatient Medications   Medication Sig Dispense Refill     albuterol (PROAIR HFA/PROVENTIL HFA/VENTOLIN HFA) 108 (90 Base) MCG/ACT inhaler        apixaban ANTICOAGULANT (ELIQUIS) 5 MG tablet Take 1 tablet (5 mg) by mouth 2 times " daily 180 tablet 2     atorvastatin (LIPITOR) 40 MG tablet Take 1 tablet (40 mg) by mouth daily. 90 tablet 1     FIBER PO Take by mouth daily.       furosemide (LASIX) 20 MG tablet Take 1 tablet (20 mg) by mouth 2 times daily. 30 tablet 1     MAGNESIUM PO Take by mouth daily. OTC       multivitamin w/minerals (THERA-VIT-M) tablet Take 1 tablet by mouth daily       predniSONE (DELTASONE) 10 MG tablet Take 10 mg by mouth daily (Patient not taking: Reported on 1/16/2025)         ALLERGIES:  Allergies   Allergen Reactions     Pcn [Penicillins]          PAST MEDICAL HISTORY:  Past Medical History:   Diagnosis Date     Atrial fibrillation, new onset (H) 10/8/2019     Cerebral infarction (H)      Uncomplicated asthma        PAST SURGICAL HISTORY:  Past Surgical History:   Procedure Laterality Date     COLONOSCOPY  4/30/2014    Procedure: COMBINED COLONOSCOPY, SINGLE BIOPSY/POLYPECTOMY BY BIOPSY;  Surgeon: Eros Licona MD;  Location:  GI       FAMILY HISTORY:  Family History   Problem Relation Age of Onset     Cancer - colorectal Father        SOCIAL HISTORY:  Social History     Socioeconomic History     Marital status:      Spouse name: None     Number of children: None     Years of education: None     Highest education level: None   Tobacco Use     Smoking status: Never     Smokeless tobacco: Never   Substance and Sexual Activity     Alcohol use: Yes     Comment: occasionally     Drug use: No     Social Drivers of Health     Financial Resource Strain: Low Risk  (9/23/2023)    [facility], [employer] [facility]    Financial Resource Strain      Difficulty of Paying Living Expenses: 3   Food Insecurity: No Food Insecurity (9/23/2023)    Received from [facility] [facility], [facility] [facility]    Food Insecurity      Worried About Running Out of Food in the Last Year: 1   Transportation Needs: No  Transportation Needs (9/23/2023)    Received from [facility], [facility]    Transportation Needs      Lack of Transportation (Medical): 1   Social Connections: Socially Integrated (9/23/2023)    Received from [facility], [facility]    Social Connections      Frequency of Communication with Friends and Family: 0   Housing Stability: Low Risk  (9/23/2023)    Received from [facility], [facility]    Housing Stability      Unable to Pay for Housing in the Last Year: 1       Today's clinic visit entailed:  40 minutes spent by me on the date of the encounter doing chart review, history and exam, documentation and further activities per the note.  The level of medical decision making during this visit was of high complexity.     The longitudinal plan of care for the diagnosis(es)/condition(s) as documented were addressed during this visit. Due to the added complexity in care, I will continue to support Tu Barraza in the subsequent management and with ongoing continuity of care.         Review of Systems:     Review of Systems:  Skin:  not assessed     Eyes:  not assessed    ENT:  not assessed    Respiratory:  Negative for shortness of breath, dyspnea on exertion, dyspnea at rest  Cardiovascular:  Negative for, palpitations, chest pain, syncope or near-syncope, dizziness, lightheadedness edema, Positive for  Gastroenterology: not assessed    Genitourinary:  not assessed    Musculoskeletal:  not assessed    Neurologic:  Negative    Psychiatric:  not assessed    Heme/Lymph/Imm:  Negative for fever, chills  Endocrine:  not assessed              Thank you for allowing me to participate in the care of your patient.      Sincerely,     Elba Harding PA-C     [facility]  cc:   El Mejia MD  [address] [place] [address]

## 2025-01-16 NOTE — PATIENT INSTRUCTIONS
It was a pleasure seeing you today!    Today's Recommendations    Start Lasix 20 mg twice daily  Continue to monitor leg swelling and weight changes at home, keep us updated with worsening swelling or increasing/unchanging weight  Repeat BMP in 1 week to check electrolytes and kidney function  Repeat Echocardiogram as soon as can get scheduled  Follow up with Elba Harding PA-C in 1 months, earlier if experiencing worsening or new cardiac symptoms    Please send a [employer] message or call our team with any questions or concerns.     Elba Harding PA-C     Electrophysiology (EP) Nurse: [phone]  Device Nurse: [phone]  General scheduling and after hours: [phone]  Electrophysiology (EP) scheduling [phone]

## 2025-01-20 ENCOUNTER — LAB (OUTPATIENT)
Dept: LAB | Facility: CLINIC | Age: OVER 89
End: 2025-01-20
Payer: COMMERCIAL

## 2025-01-20 ENCOUNTER — OFFICE VISIT (OUTPATIENT)
Dept: CARDIOLOGY | Facility: CLINIC | Age: OVER 89
End: 2025-01-20
Payer: COMMERCIAL

## 2025-01-20 VITALS
WEIGHT: 177.7 LBS | OXYGEN SATURATION: 98 % | HEIGHT: 69 IN | SYSTOLIC BLOOD PRESSURE: 154 MMHG | DIASTOLIC BLOOD PRESSURE: 74 MMHG | HEART RATE: 64 BPM | BODY MASS INDEX: 26.32 KG/M2

## 2025-01-20 DIAGNOSIS — I42.9 CARDIOMYOPATHY, UNSPECIFIED TYPE (H): ICD-10-CM

## 2025-01-20 DIAGNOSIS — E87.70 HYPERVOLEMIA, UNSPECIFIED HYPERVOLEMIA TYPE: ICD-10-CM

## 2025-01-20 LAB
ANION GAP SERPL CALCULATED.3IONS-SCNC: 10 MMOL/L (ref 7–15)
BUN SERPL-MCNC: 26.2 MG/DL (ref 8–23)
CALCIUM SERPL-MCNC: 9.5 MG/DL (ref 8.8–10.4)
CHLORIDE SERPL-SCNC: 106 MMOL/L (ref 98–107)
CREAT SERPL-MCNC: 1.39 MG/DL (ref 0.67–1.17)
EGFRCR SERPLBLD CKD-EPI 2021: 48 ML/MIN/1.73M2
GLUCOSE SERPL-MCNC: 131 MG/DL (ref 70–99)
HCO3 SERPL-SCNC: 27 MMOL/L (ref 22–29)
POTASSIUM SERPL-SCNC: 4.7 MMOL/L (ref 3.4–5.3)
SODIUM SERPL-SCNC: 143 MMOL/L (ref 135–145)

## 2025-01-20 PROCEDURE — 80048 BASIC METABOLIC PNL TOTAL CA: CPT

## 2025-01-20 PROCEDURE — 99215 OFFICE O/P EST HI 40 MIN: CPT

## 2025-01-20 PROCEDURE — G2211 COMPLEX E/M VISIT ADD ON: HCPCS

## 2025-01-20 PROCEDURE — 36415 COLL VENOUS BLD VENIPUNCTURE: CPT

## 2025-01-20 RX ORDER — CARVEDILOL 6.25 MG/1
6.25 TABLET ORAL 2 TIMES DAILY WITH MEALS
Qty: 180 TABLET | Refills: 3 | Status: SHIPPED | OUTPATIENT
Start: 2025-01-20

## 2025-01-20 NOTE — PATIENT INSTRUCTIONS
It was a pleasure seeing you today!    Today's Recommendations    Your kidney function is stable  Start Coreg 6.25 mg twice daily  Continue Lasix 20 mg twice daily, monitor leg swelling and weight  Follow up with Dr. Garces in April, earlier if experiencing worsening or new cardiac symptoms  If you experience chest pain, or worsening shortness of breath, go to the emergency department for evaluation    Please send a [employer] message or call our team with any questions or concerns.     Elba Harding PA-C     Electrophysiology (EP) Nurse: [phone]  Device Nurse: [phone]  General scheduling and after hours: [phone]  Electrophysiology (EP) scheduling [phone]

## 2025-01-20 NOTE — LETTER
1/20/2025    El Mejia MD  [address] [place] [address]    RE: Tu Barraza       Dear Colleague,     I had the pleasure of seeing Tu Barraza in the [facility].      General Cardiology Clinic Progress Note  Tu Barraza MRN# [id number]   YOB: 1935 Age: 89 year old     Primary cardiologist: Dr. Garces    Reason for visit: Follow up for: edema, congestive heart failure      Assessment & Plan:  Acute on chronic congestive heart failure  Renal function today stable, K 4.7  TTE last week with mildly reduced LVEF (45-50%) and stable wall motion abnormalities  Improving lower extremity edema, continue Lasix 20 mg BID  Monitor swelling and weight, will send [employer] message with update once home weight is at 170 pounds (was 173 pounds at home yesterday)  Monitor sodium intake with recommended daily intake < 2,000 mg   Start Coreg 6.25 mg BID for heart failure and afib  Reports SBP at home has been 120s, patient will monitor BP at home  Monitor BP 1-2 times daily at home, and especially if feeling dizzy/lightheaded  Consider initiating Entresto if affordable and BP tolerates  Follow up with Dr. Garces in April  Possible coronary artery disease  Noted to have silent MI in previous cardiology notes, along with stable regional wall motion abnormalities on TTE- discussed this with patient and he had not been aware of previous MI  Discussed option to proceed with further ischemic workup vs continuing with medical management and symptom monitoring- he would be open to discussing options when they return from [place] in April  In the meantime, if he experiences chest pain/pressure or worsening shortness of breath, he is aware to be taken to the emergency department for further evaluation  Atrial fibrillation  Newly noted with recent hospitalization on cruise  Severely dilated bilateral atria  Continue diuresis, initiating BB therapy    Pertinent Detailed Problem  List:  Cardiomyopathy  Was recently hospitalized on cruise ship in [facility] (just got back from trip on 12/24/24) with diagnosis of pneumonia. Appears he was discharged with Imdur, but reports he is not taking it and thought it was only for a short course. Was also discharged on Lasix 40 mg PO BID, although patient's wife reports he only took it once a day. He was additionally discharged with a prednisone taper and PO Levofloxacin course.  ProBNP 1/10/25- 2753; increased from ProBNP during cruise ship hospitalization, which was in 900s per discharge papers  TTE on 1/16/25- LVEF 45-50%, no significant valvular disease (1-2+ TR, 1+ MR), normal RV size and function, stable wall motion abnormalities, severely dilated LA and RA  TTE on 4/25/2022- LVEF 50-55% No significant valvular disease, Normal RV size and function, Dilated LA, Normal IVC, new apical regional wall motion abnormalities with similar septal wall motion abnormalities compared to 11/14/19 (majority of anteroseptal wall akinetic, mid-distal inferoseptal wall is akinetic)    Atrial fibrillation  On Eliquis 5 mg BID  Hyperlipidemia  On atorvastatin 40 mg daily  Last lipid panel in 2023 within goal, has been maintained within goal for years  Acute lower extremity edema  1/15/2025- Bilateral LE US negative for DVT  History of CVA  Known bilateral carotid stenosis  50-69% left carotid stenosis 1/12/2022  <50% stenosis of right carotid 1/11/2022  Follows with Dr Starr in vascular  Hypertension     Per chart review, PCP recommended addition of amlodipine in 2023, patient did not want a new medication and this was not started  History of smoking  COPD  Follows with pulmonology, last OV 1/6/25  Asthma  CKD stage 3a  Creatinine 1.3-1.4 since 2023  eGFR 49-55    HPI:  Tu Barraza is a pleasant 89 year old patient who presents today for follow up of lower extremity edema and test result review. His swelling is improved and he reports decreased weight. His shoes  "are fitting better. He plans to leave for [place] tomorrow. He has been taking the Lasix twice daily as prescribed. Reports his SBP at home has been in the 120s. He frequently checks his BP at home. Does not add extra salt to food, plans to be aware of sodium in prepared foods going forward.   Denies Palpitations, Dizziness/lightheadedness, Dyspnea, Chest pain, and Chest tightness or heaviness.      Elba Harding PA-C   Physician Assistant   [facility]     PAST SURGICAL HISTORY:   Past Surgical History:   Procedure Laterality Date     COLONOSCOPY  4/30/2014    Procedure: COMBINED COLONOSCOPY, SINGLE BIOPSY/POLYPECTOMY BY BIOPSY;  Surgeon: Eros Licona MD;  Location:  GI       FAMILY HISTORY:   Family History   Problem Relation Age of Onset     Cancer - colorectal Father        SOCIAL HISTORY:   Social History     Tobacco Use     Smoking status: Never     Smokeless tobacco: Never   Substance Use Topics     Alcohol use: Yes     Comment: occasionally           Objective:    Vitals: BP (!) 154/74 (BP Location: Right arm, Patient Position: Sitting)   Pulse 64   Ht 1.753 m (5' 9\")   Wt 80.6 kg (177 lb 11.2 oz)   SpO2 98%   BMI 26.24 kg/m      Physical Exam:  General: Awake and alert. No acute distress.  Skin: Warm and dry. Appropriate color. No lesions or rashes noted.  HEENT: Normocephalic and atraumatic. EOM intact. PERRL. Trachea midline. Right-sided JVD: none.  Cardiac: Normal S1 and S2, no murmurs, rubs, or gallops noted. Irregular rate, irregular rhythm.  Lung: Regular work of breathing without accessory muscle use. Clear to auscultation bilaterally.  Abdomen: No distension.  Extremities: Bilateral lower extremity edema: 1-2+.  Neuro: A & O. No focal deficits noted. Moves all extremities appropriately.      CURRENT MEDICATIONS:  Current Outpatient Medications   Medication Sig Dispense Refill     albuterol (PROAIR HFA/PROVENTIL HFA/VENTOLIN HFA) 108 (90 Base) MCG/ACT inhaler        " apixaban ANTICOAGULANT (ELIQUIS) 5 MG tablet Take 1 tablet (5 mg) by mouth 2 times daily 180 tablet 2     atorvastatin (LIPITOR) 40 MG tablet Take 1 tablet (40 mg) by mouth daily. 90 tablet 1     carvedilol (COREG) 6.25 MG tablet Take 1 tablet (6.25 mg) by mouth 2 times daily (with meals). 180 tablet 3     FIBER PO Take by mouth daily.       furosemide (LASIX) 20 MG tablet Take 1 tablet (20 mg) by mouth 2 times daily. 30 tablet 1     MAGNESIUM PO Take by mouth daily. OTC       multivitamin w/minerals (THERA-VIT-M) tablet Take 1 tablet by mouth daily         ALLERGIES:  Allergies   Allergen Reactions     Pcn [Penicillins]          PAST MEDICAL HISTORY:  Past Medical History:   Diagnosis Date     Atrial fibrillation, new onset (H) 10/8/2019     Cerebral infarction (H)      Uncomplicated asthma        PAST SURGICAL HISTORY:  Past Surgical History:   Procedure Laterality Date     COLONOSCOPY  4/30/2014    Procedure: COMBINED COLONOSCOPY, SINGLE BIOPSY/POLYPECTOMY BY BIOPSY;  Surgeon: Eros Licona MD;  Location:  GI       FAMILY HISTORY:  Family History   Problem Relation Age of Onset     Cancer - colorectal Father        SOCIAL HISTORY:  Social History     Socioeconomic History     Marital status:      Spouse name: None     Number of children: None     Years of education: None     Highest education level: None   Tobacco Use     Smoking status: Never     Smokeless tobacco: Never   Substance and Sexual Activity     Alcohol use: Yes     Comment: occasionally     Drug use: No     Social Drivers of Health     Financial Resource Strain: Low Risk  (9/23/2023)    [facility], [facility]    Financial Resource Strain      Difficulty of Paying Living Expenses: 3   Food Insecurity: No Food Insecurity (9/23/2023)    Received from [facility], [facility] [employer]    Food Insecurity       Worried About Running Out of Food in the Last Year: 1   Transportation Needs: No Transportation Needs (9/23/2023)    Received from [facility] [employer] [facility], [facility]    Transportation Needs      Lack of Transportation (Medical): 1   Social Connections: Socially Integrated (9/23/2023)    Received from [facility], [facility]    Social Connections      Frequency of Communication with Friends and Family: 0   Housing Stability: Low Risk  (9/23/2023)    [facility] [facility], [facility]    Housing Stability      Unable to Pay for Housing in the Last Year: 1       Today's clinic visit entailed:  40 minutes spent by me on the date of the encounter doing chart review, history and exam, documentation and further activities per the note.  The level of medical decision making during this visit was of high complexity.     The longitudinal plan of care for the diagnosis(es)/condition(s) as documented were addressed during this visit. Due to the added complexity in care, I will continue to support Tu Barraza in the subsequent management and with ongoing continuity of care.         Review of Systems:     Review of Systems:  Skin:        Eyes:       ENT:       Respiratory:  Negative shortness of breath  Cardiovascular:  Negative, palpitations, chest pain, fatigue, lightheadedness, dizziness Positive for, edema  Gastroenterology:      Genitourinary:       Musculoskeletal:       Neurologic:       Psychiatric:       Heme/Lymph/Imm:       Endocrine:                 Thank you for allowing me to participate in the care of your patient.      Sincerely,     Elba Harding PA-C     [facility]  cc:   Elba Harding PA-C  [address], [address] [address]

## 2025-01-20 NOTE — PROGRESS NOTES
General Cardiology Clinic Progress Note  Tu Barraza MRN# [id number]   YOB: 1935 Age: 89 year old     Primary cardiologist: Dr. Garces    Reason for visit: Follow up for: edema, congestive heart failure      Assessment & Plan:  Acute on chronic congestive heart failure  Renal function today stable, K 4.7  TTE last week with mildly reduced LVEF (45-50%) and stable wall motion abnormalities  Improving lower extremity edema, continue Lasix 20 mg BID  Monitor swelling and weight, will send [employer] message with update once home weight is at 170 pounds (was 173 pounds at home yesterday)  Monitor sodium intake with recommended daily intake < 2,000 mg   Start Coreg 6.25 mg BID for heart failure and afib  Reports SBP at home has been 120s, patient will monitor BP at home  Monitor BP 1-2 times daily at home, and especially if feeling dizzy/lightheaded  Consider initiating Entresto if affordable and BP tolerates  Follow up with Dr. Garces in April  Possible coronary artery disease  Noted to have silent MI in previous cardiology notes, along with stable regional wall motion abnormalities on TTE- discussed this with patient and he had not been aware of previous MI  Discussed option to proceed with further ischemic workup vs continuing with medical management and symptom monitoring- he would be open to discussing options when they return from [place] in April  In the meantime, if he experiences chest pain/pressure or worsening shortness of breath, he is aware to be taken to the emergency department for further evaluation  Atrial fibrillation  Newly noted with recent hospitalization on cruise  Severely dilated bilateral atria  Continue diuresis, initiating BB therapy    Pertinent Detailed Problem List:  Cardiomyopathy  Was recently hospitalized on cruise ship in [facility] (just got back from trip on 12/24/24) with diagnosis of pneumonia. Appears he was discharged with Imdur, but reports he is not taking it and  thought it was only for a short course. Was also discharged on Lasix 40 mg PO BID, although patient's wife reports he only took it once a day. He was additionally discharged with a prednisone taper and PO Levofloxacin course.  ProBNP 1/10/25- 2753; increased from ProBNP during cruise ship hospitalization, which was in 900s per discharge papers  TTE on 1/16/25- LVEF 45-50%, no significant valvular disease (1-2+ TR, 1+ MR), normal RV size and function, stable wall motion abnormalities, severely dilated LA and RA  TTE on 4/25/2022- LVEF 50-55% No significant valvular disease, Normal RV size and function, Dilated LA, Normal IVC, new apical regional wall motion abnormalities with similar septal wall motion abnormalities compared to 11/14/19 (majority of anteroseptal wall akinetic, mid-distal inferoseptal wall is akinetic)    Atrial fibrillation  On Eliquis 5 mg BID  Hyperlipidemia  On atorvastatin 40 mg daily  Last lipid panel in 2023 within goal, has been maintained within goal for years  Acute lower extremity edema  1/15/2025- Bilateral LE US negative for DVT  History of CVA  Known bilateral carotid stenosis  50-69% left carotid stenosis 1/12/2022  <50% stenosis of right carotid 1/11/2022  Follows with Dr Starr in vascular  Hypertension     Per chart review, PCP recommended addition of amlodipine in 2023, patient did not want a new medication and this was not started  History of smoking  COPD  Follows with pulmonology, last OV 1/6/25  Asthma  CKD stage 3a  Creatinine 1.3-1.4 since 2023  eGFR 49-55    HPI:  Tu Barraza is a pleasant 89 year old patient who presents today for follow up of lower extremity edema and test result review. His swelling is improved and he reports decreased weight. His shoes are fitting better. He plans to leave for [place] tomorrow. He has been taking the Lasix twice daily as prescribed. Reports his SBP at home has been in the 120s. He frequently checks his BP at home. Does not add extra  "salt to food, plans to be aware of sodium in prepared foods going forward.   Denies Palpitations, Dizziness/lightheadedness, Dyspnea, Chest pain, and Chest tightness or heaviness.      Elba Harding PA-C   Physician Assistant   [facility]     PAST SURGICAL HISTORY:   Past Surgical History:   Procedure Laterality Date    COLONOSCOPY  4/30/2014    Procedure: COMBINED COLONOSCOPY, SINGLE BIOPSY/POLYPECTOMY BY BIOPSY;  Surgeon: Eros Licona MD;  Location:  GI       FAMILY HISTORY:   Family History   Problem Relation Age of Onset    Cancer - colorectal Father        SOCIAL HISTORY:   Social History     Tobacco Use    Smoking status: Never    Smokeless tobacco: Never   Substance Use Topics    Alcohol use: Yes     Comment: occasionally           Objective:    Vitals: BP (!) 154/74 (BP Location: Right arm, Patient Position: Sitting)   Pulse 64   Ht 1.753 m (5' 9\")   Wt 80.6 kg (177 lb 11.2 oz)   SpO2 98%   BMI 26.24 kg/m      Physical Exam:  General: Awake and alert. No acute distress.  Skin: Warm and dry. Appropriate color. No lesions or rashes noted.  HEENT: Normocephalic and atraumatic. EOM intact. PERRL. Trachea midline. Right-sided JVD: none.  Cardiac: Normal S1 and S2, no murmurs, rubs, or gallops noted. Irregular rate, irregular rhythm.  Lung: Regular work of breathing without accessory muscle use. Clear to auscultation bilaterally.  Abdomen: No distension.  Extremities: Bilateral lower extremity edema: 1-2+.  Neuro: A & O. No focal deficits noted. Moves all extremities appropriately.      CURRENT MEDICATIONS:  Current Outpatient Medications   Medication Sig Dispense Refill    albuterol (PROAIR HFA/PROVENTIL HFA/VENTOLIN HFA) 108 (90 Base) MCG/ACT inhaler       apixaban ANTICOAGULANT (ELIQUIS) 5 MG tablet Take 1 tablet (5 mg) by mouth 2 times daily 180 tablet 2    atorvastatin (LIPITOR) 40 MG tablet Take 1 tablet (40 mg) by mouth daily. 90 tablet 1    carvedilol (COREG) 6.25 MG " tablet Take 1 tablet (6.25 mg) by mouth 2 times daily (with meals). 180 tablet 3    FIBER PO Take by mouth daily.      furosemide (LASIX) 20 MG tablet Take 1 tablet (20 mg) by mouth 2 times daily. 30 tablet 1    MAGNESIUM PO Take by mouth daily. OTC      multivitamin w/minerals (THERA-VIT-M) tablet Take 1 tablet by mouth daily         ALLERGIES:  Allergies   Allergen Reactions    Pcn [Penicillins]          PAST MEDICAL HISTORY:  Past Medical History:   Diagnosis Date    Atrial fibrillation, new onset (H) 10/8/2019    Cerebral infarction (H)     Uncomplicated asthma        PAST SURGICAL HISTORY:  Past Surgical History:   Procedure Laterality Date    COLONOSCOPY  4/30/2014    Procedure: COMBINED COLONOSCOPY, SINGLE BIOPSY/POLYPECTOMY BY BIOPSY;  Surgeon: Eros Licona MD;  Location:  GI       FAMILY HISTORY:  Family History   Problem Relation Age of Onset    Cancer - colorectal Father        SOCIAL HISTORY:  Social History     Socioeconomic History    Marital status:      Spouse name: None    Number of children: None    Years of education: None    Highest education level: None   Tobacco Use    Smoking status: Never    Smokeless tobacco: Never   Substance and Sexual Activity    Alcohol use: Yes     Comment: occasionally    Drug use: No     Social Drivers of Health     Financial Resource Strain: Low Risk  (9/23/2023)    [facility] [employer] [facility], [facility]    Financial Resource Strain     Difficulty of Paying Living Expenses: 3   Food Insecurity: No Food Insecurity (9/23/2023)    Received from [employer] [facility], [facility]    Food Insecurity     Worried About Running Out of Food in the Last Year: 1   Transportation Needs: No Transportation Needs (9/23/2023)    Received from [employer] [facility], [facility]    Transportation  Needs     Lack of Transportation (Medical): 1   Social Connections: Socially Integrated (9/23/2023)    [facility]    Social Connections     Frequency of Communication with Friends and Family: 0   Housing Stability: Low Risk  (9/23/2023)    Received from [facility]  [facility]    Housing Stability     Unable to Pay for Housing in the Last Year: 1       Today's clinic visit entailed:  40 minutes spent by me on the date of the encounter doing chart review, history and exam, documentation and further activities per the note.  The level of medical decision making during this visit was of high complexity.     The longitudinal plan of care for the diagnosis(es)/condition(s) as documented were addressed during this visit. Due to the added complexity in care, I will continue to support Tu Barraza in the subsequent management and with ongoing continuity of care.         Review of Systems:     Review of Systems:  Skin:        Eyes:       ENT:       Respiratory:  Negative shortness of breath  Cardiovascular:  Negative, palpitations, chest pain, fatigue, lightheadedness, dizziness Positive for, edema  Gastroenterology:      Genitourinary:       Musculoskeletal:       Neurologic:       Psychiatric:       Heme/Lymph/Imm:       Endocrine:

## 2025-03-06 ENCOUNTER — MYC REFILL (OUTPATIENT)
Dept: CARDIOLOGY | Facility: CLINIC | Age: OVER 89
End: 2025-03-06
Payer: COMMERCIAL

## 2025-03-06 DIAGNOSIS — I42.9 CARDIOMYOPATHY, UNSPECIFIED TYPE (H): ICD-10-CM

## 2025-03-06 DIAGNOSIS — E87.70 HYPERVOLEMIA, UNSPECIFIED HYPERVOLEMIA TYPE: ICD-10-CM

## 2025-03-06 RX ORDER — FUROSEMIDE 20 MG/1
20 TABLET ORAL
Qty: 180 TABLET | Refills: 1 | Status: SHIPPED | OUTPATIENT
Start: 2025-03-06

## 2025-03-25 ENCOUNTER — TELEPHONE (OUTPATIENT)
Dept: CARDIOLOGY | Facility: CLINIC | Age: OVER 89
End: 2025-03-25
Payer: COMMERCIAL

## 2025-03-25 ENCOUNTER — MYC REFILL (OUTPATIENT)
Dept: CARDIOLOGY | Facility: CLINIC | Age: OVER 89
End: 2025-03-25
Payer: COMMERCIAL

## 2025-03-25 DIAGNOSIS — E87.70 HYPERVOLEMIA, UNSPECIFIED HYPERVOLEMIA TYPE: Primary | ICD-10-CM

## 2025-03-25 DIAGNOSIS — I63.9 CEREBROVASCULAR ACCIDENT (CVA), UNSPECIFIED MECHANISM (H): ICD-10-CM

## 2025-03-25 RX ORDER — ATORVASTATIN CALCIUM 40 MG/1
40 TABLET, FILM COATED ORAL DAILY
Qty: 30 TABLET | Refills: 0 | Status: SHIPPED | OUTPATIENT
Start: 2025-03-25

## 2025-03-25 NOTE — TELEPHONE ENCOUNTER
Request for refill on atorvastatin.  Last abena panel 12/18/2023.    Verbal orders per PEYTON Boyd  --FLP prior to appt with Dr Garces on 4/22  --fill atorvastatin x 1 month  Scheduled patient for FLP on 4/21 at 745am.  He currently arango in [place] and will not be back until 4/19.  Requesting 30 day fill to be sent to the [facility] in [place], [place].  DEREK Macdonald

## 2025-04-21 ENCOUNTER — LAB (OUTPATIENT)
Dept: LAB | Facility: CLINIC | Age: OVER 89
End: 2025-04-21
Payer: COMMERCIAL

## 2025-04-21 DIAGNOSIS — E87.70 HYPERVOLEMIA, UNSPECIFIED HYPERVOLEMIA TYPE: ICD-10-CM

## 2025-04-21 LAB
CHOLEST SERPL-MCNC: 112 MG/DL
FASTING STATUS PATIENT QL REPORTED: YES
HDLC SERPL-MCNC: 44 MG/DL
LDLC SERPL CALC-MCNC: 59 MG/DL
NONHDLC SERPL-MCNC: 68 MG/DL
TRIGL SERPL-MCNC: 46 MG/DL

## 2025-04-22 ENCOUNTER — OFFICE VISIT (OUTPATIENT)
Dept: CARDIOLOGY | Facility: CLINIC | Age: OVER 89
End: 2025-04-22
Payer: COMMERCIAL

## 2025-04-22 VITALS
BODY MASS INDEX: 25.48 KG/M2 | WEIGHT: 172 LBS | HEIGHT: 69 IN | HEART RATE: 51 BPM | OXYGEN SATURATION: 96 % | SYSTOLIC BLOOD PRESSURE: 142 MMHG | DIASTOLIC BLOOD PRESSURE: 78 MMHG

## 2025-04-22 DIAGNOSIS — I48.0 PAROXYSMAL ATRIAL FIBRILLATION (H): Primary | ICD-10-CM

## 2025-04-22 NOTE — PROGRESS NOTES
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  He was previously seen by Dr. Arndt.    He suffered embolic stroke in 2019.  He was diagnosed with atrial fibrillation with controlled ventricular rate at that point as well as cardiomyopathy with anteroseptal wall motion normality.  His ejection fraction at that time was mildly reduced at 45 to 50%.  Given wall motion normality, email workup was recommended but patient declined as he was completely asymptomatic.    His medical history is also notable for chronic asthma/emphysema.  He had an exacerbation last month while on vacation in [facility].  He was treated with steroids, antibiotics and inhalers.  He has follow-up scheduled with pulmonology.    He is very active and does not endorse any chest pain.  No palpitations, presyncope or syncope.  He has chronic lower extremity edema which is likely due to venous insufficiency.    PAST MEDICAL HISTORY:  Past Medical History:   Diagnosis Date    Atrial fibrillation, new onset (H) 10/8/2019    Cerebral infarction (H)     Uncomplicated asthma        MEDICATIONS:  Current Outpatient Medications   Medication Sig Dispense Refill    albuterol (PROAIR HFA/PROVENTIL HFA/VENTOLIN HFA) 108 (90 Base) MCG/ACT inhaler       apixaban ANTICOAGULANT (ELIQUIS) 5 MG tablet Take 1 tablet (5 mg) by mouth 2 times daily 180 tablet 2    atorvastatin (LIPITOR) 40 MG tablet Take 1 tablet (40 mg) by mouth daily. 30 tablet 0    carvedilol (COREG) 6.25 MG tablet Take 1 tablet (6.25 mg) by mouth 2 times daily (with meals). 180 tablet 3    FIBER PO Take by mouth daily.      furosemide (LASIX) 20 MG tablet Take 1 tablet (20 mg) by mouth 2 times daily. 180 tablet 1    MAGNESIUM  PO Take by mouth daily. OTC      multivitamin w/minerals (THERA-VIT-M) tablet Take 1 tablet by mouth daily       No current facility-administered medications for this visit.       SOCIAL HISTORY:  I have reviewed this patient's social history and updated it with pertinent information if needed. Tu Barraza  reports that he has never smoked. He has never used smokeless tobacco. He reports current alcohol use. He reports that he does not use drugs.    PHYSICAL EXAM:  Pulse:  [51] 51  BP: (142)/(78) 142/78  SpO2:  [96 %] 96 %  172 lbs 0 oz    Constitutional: alert, no distress  Respiratory: Good bilateral air entry  Cardiovascular: Regular rhythm, no murmurs  GI: nondistended  Neuropsychiatric: appropriate affact    ASSESSMENT: Pertinent issues addressed/ reviewed during this cardiology visit    Paroxysmal atrial fibrillation, now in sinus rhythm  History of prior embolic CVA secondary to #1  Mild LV systolic dysfunction, EF 45 to 50%), likely rate related but cannot rule out coronary disease  Chronic asthma/emphysema  Chronic lower extremity edema, likely due to venous insufficiency    RECOMMENDATIONS:  He is quite stable from cardiac point of view.  He denies significant exertional shortness of breath.  He denies angina.  Given mild cardiomyopathy and wall motion abnormalities , I again suggested ischemia workup but he declined.  This is reasonable since he has no angina.  He will let us know if he develops symptoms.    He will continue current medical program including beta-blocker, Eliquis and atorvastatin.  He will continue to use furosemide as needed    Follow-up in 1 year with an SMITH    It was a pleasure seeing this patient in clinic today. Please do not hesitate to contact me with any future questions.     Сергей Garces MD, [facility]  Cardiology - [facility]  April 22, 2025    Review of the result(s) of each unique test - Last echocardiogram, CBC, BMP     The level of medical decision making during this visit was of  moderate complexity.    The longitudinal plan of care for the diagnosis(es)/condition(s) as documented were addressed during this visit. Due to the added complexity in care, I will continue to support Jose in the subsequent management and with ongoing continuity of care.     This note was completed in part using dictation via the Dragon voice recognition software. Some word and grammatical errors may occur and must be interpreted in the appropriate clinical context.  If there are any questions pertaining to this issue, please contact me for further clarification.

## 2025-04-22 NOTE — LETTER
4/22/2025    El Mejia MD  [address] [place] [address]    RE: Tu Barraza       Dear Colleague,     I had the pleasure of seeing Tu Barraza in the [facility].  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  He was previously seen by Dr. Arndt.    He suffered embolic stroke in 2019.  He was diagnosed with atrial fibrillation with controlled ventricular rate at that point as well as cardiomyopathy with anteroseptal wall motion normality.  His ejection fraction at that time was mildly reduced at 45 to 50%.  Given wall motion normality, email workup was recommended but patient declined as he was completely asymptomatic.    His medical history is also notable for chronic asthma/emphysema.  He had an exacerbation last month while on vacation in [facility].  He was treated with steroids, antibiotics and inhalers.  He has follow-up scheduled with pulmonology.    He is very active and does not endorse any chest pain.  No palpitations, presyncope or syncope.  He has chronic lower extremity edema which is likely due to venous insufficiency.    PAST MEDICAL HISTORY:  Past Medical History:   Diagnosis Date     Atrial fibrillation, new onset (H) 10/8/2019     Cerebral infarction (H)      Uncomplicated asthma        MEDICATIONS:  Current Outpatient Medications   Medication Sig Dispense Refill     albuterol (PROAIR HFA/PROVENTIL HFA/VENTOLIN HFA) 108 (90 Base) MCG/ACT inhaler        apixaban ANTICOAGULANT (ELIQUIS) 5 MG tablet Take 1 tablet (5 mg) by mouth 2 times daily 180 tablet 2     atorvastatin (LIPITOR) 40 MG tablet Take 1 tablet (40 mg) by mouth daily. 30 tablet 0     carvedilol (COREG) 6.25 MG tablet Take 1  tablet (6.25 mg) by mouth 2 times daily (with meals). 180 tablet 3     FIBER PO Take by mouth daily.       furosemide (LASIX) 20 MG tablet Take 1 tablet (20 mg) by mouth 2 times daily. 180 tablet 1     MAGNESIUM PO Take by mouth daily. OTC       multivitamin w/minerals (THERA-VIT-M) tablet Take 1 tablet by mouth daily       No current facility-administered medications for this visit.       SOCIAL HISTORY:  I have reviewed this patient's social history and updated it with pertinent information if needed. Tu Barraza  reports that he has never smoked. He has never used smokeless tobacco. He reports current alcohol use. He reports that he does not use drugs.    PHYSICAL EXAM:  Pulse:  [51] 51  BP: (142)/(78) 142/78  SpO2:  [96 %] 96 %  172 lbs 0 oz    Constitutional: alert, no distress  Respiratory: Good bilateral air entry  Cardiovascular: Regular rhythm, no murmurs  GI: nondistended  Neuropsychiatric: appropriate affact    ASSESSMENT: Pertinent issues addressed/ reviewed during this cardiology visit    Paroxysmal atrial fibrillation, now in sinus rhythm  History of prior embolic CVA secondary to #1  Mild LV systolic dysfunction, EF 45 to 50%), likely rate related but cannot rule out coronary disease  Chronic asthma/emphysema  Chronic lower extremity edema, likely due to venous insufficiency    RECOMMENDATIONS:  He is quite stable from cardiac point of view.  He denies significant exertional shortness of breath.  He denies angina.  Given mild cardiomyopathy and wall motion abnormalities , I again suggested ischemia workup but he declined.  This is reasonable since he has no angina.  He will let us know if he develops symptoms.    He will continue current medical program including beta-blocker, Eliquis and atorvastatin.  He will continue to use furosemide as needed    Follow-up in 1 year with an SMITH    It was a pleasure seeing this patient in clinic today. Please do not hesitate to contact me with any future  questions.     Сергей Garces MD, [facility]  Cardiology - [facility]  April 22, 2025    Review of the result(s) of each unique test - Last echocardiogram, CBC, BMP     The level of medical decision making during this visit was of moderate complexity.    The longitudinal plan of care for the diagnosis(es)/condition(s) as documented were addressed during this visit. Due to the added complexity in care, I will continue to support Jose in the subsequent management and with ongoing continuity of care.     This note was completed in part using dictation via the Dragon voice recognition software. Some word and grammatical errors may occur and must be interpreted in the appropriate clinical context.  If there are any questions pertaining to this issue, please contact me for further clarification.      Thank you for allowing me to participate in the care of your patient.      Sincerely,     Сергей Garces MD, MD     [facility]  cc:   Olga Arndt MD  No address on file

## 2025-05-20 ENCOUNTER — ANCILLARY PROCEDURE (OUTPATIENT)
Dept: GENERAL RADIOLOGY | Facility: CLINIC | Age: OVER 89
End: 2025-05-20
Payer: COMMERCIAL

## 2025-05-20 ENCOUNTER — OFFICE VISIT (OUTPATIENT)
Dept: FAMILY MEDICINE | Facility: CLINIC | Age: OVER 89
End: 2025-05-20
Payer: COMMERCIAL

## 2025-05-20 VITALS
BODY MASS INDEX: 24.88 KG/M2 | HEIGHT: 69 IN | HEART RATE: 49 BPM | DIASTOLIC BLOOD PRESSURE: 52 MMHG | SYSTOLIC BLOOD PRESSURE: 115 MMHG | OXYGEN SATURATION: 90 % | WEIGHT: 168 LBS | RESPIRATION RATE: 16 BRPM

## 2025-05-20 DIAGNOSIS — R60.0 PEDAL EDEMA: ICD-10-CM

## 2025-05-20 DIAGNOSIS — I50.33 ACUTE ON CHRONIC DIASTOLIC CONGESTIVE HEART FAILURE (H): ICD-10-CM

## 2025-05-20 DIAGNOSIS — I48.0 PAROXYSMAL ATRIAL FIBRILLATION (H): ICD-10-CM

## 2025-05-20 DIAGNOSIS — J45.901 SEVERE ASTHMA WITH ACUTE EXACERBATION, UNSPECIFIED WHETHER PERSISTENT: ICD-10-CM

## 2025-05-20 DIAGNOSIS — J45.901 SEVERE ASTHMA WITH ACUTE EXACERBATION, UNSPECIFIED WHETHER PERSISTENT: Primary | ICD-10-CM

## 2025-05-20 LAB
ERYTHROCYTE [DISTWIDTH] IN BLOOD BY AUTOMATED COUNT: 13.3 % (ref 10–15)
HCT VFR BLD AUTO: 45.2 % (ref 40–53)
HGB BLD-MCNC: 14.7 G/DL (ref 13.3–17.7)
MCH RBC QN AUTO: 32.6 PG (ref 26.5–33)
MCHC RBC AUTO-ENTMCNC: 32.5 G/DL (ref 31.5–36.5)
MCV RBC AUTO: 100 FL (ref 78–100)
PLATELET # BLD AUTO: 285 10E3/UL (ref 150–450)
RBC # BLD AUTO: 4.51 10E6/UL (ref 4.4–5.9)
WBC # BLD AUTO: 10.8 10E3/UL (ref 4–11)

## 2025-05-20 PROCEDURE — 83880 ASSAY OF NATRIURETIC PEPTIDE: CPT

## 2025-05-20 PROCEDURE — 94640 AIRWAY INHALATION TREATMENT: CPT

## 2025-05-20 PROCEDURE — 80048 BASIC METABOLIC PNL TOTAL CA: CPT

## 2025-05-20 PROCEDURE — 3078F DIAST BP <80 MM HG: CPT

## 2025-05-20 PROCEDURE — 99204 OFFICE O/P NEW MOD 45 MIN: CPT | Mod: 25

## 2025-05-20 PROCEDURE — 3074F SYST BP LT 130 MM HG: CPT

## 2025-05-20 PROCEDURE — 36415 COLL VENOUS BLD VENIPUNCTURE: CPT

## 2025-05-20 PROCEDURE — 85027 COMPLETE CBC AUTOMATED: CPT

## 2025-05-20 PROCEDURE — 71046 X-RAY EXAM CHEST 2 VIEWS: CPT | Mod: TC | Performed by: RADIOLOGY

## 2025-05-20 RX ORDER — PREDNISONE 20 MG/1
40 TABLET ORAL DAILY
Qty: 10 TABLET | Refills: 0 | Status: SHIPPED | OUTPATIENT
Start: 2025-05-20 | End: 2025-05-25

## 2025-05-20 RX ORDER — AZITHROMYCIN 250 MG/1
TABLET, FILM COATED ORAL
Qty: 6 TABLET | Refills: 0 | Status: SHIPPED | OUTPATIENT
Start: 2025-05-20 | End: 2025-05-25

## 2025-05-20 RX ORDER — IPRATROPIUM BROMIDE AND ALBUTEROL SULFATE 2.5; .5 MG/3ML; MG/3ML
3 SOLUTION RESPIRATORY (INHALATION) ONCE
Status: COMPLETED | OUTPATIENT
Start: 2025-05-20 | End: 2025-05-20

## 2025-05-20 RX ADMIN — IPRATROPIUM BROMIDE AND ALBUTEROL SULFATE 3 ML: 2.5; .5 SOLUTION RESPIRATORY (INHALATION) at 10:42

## 2025-05-20 NOTE — PROGRESS NOTES
Assessment & Plan     (J45.901) Severe asthma with acute exacerbation, unspecified whether persistent  (primary encounter diagnosis)  Comment: Patient recently went to [place] where allergen in the air/environment were high.  Allergens in current environment also elevated.  Suspect could be triggering patient's severe asthma/emphysema.  DuoNeb in clinic today, no improvement with this.  Continue with albuterol as needed.  Will provide patient prednisone burst with azithromycin.  Will get x-ray to further evaluate to ensure stable.  Lab work as well to further evaluate.  If x-ray noting concerns for pneumonia we will add on Augmentin to azithromycin.  Otherwise, follow-up if not noting any improvement.  Will schedule patient today for an establish care visit with new provider. Discussed medication risks and benefits of Azithromycin and Prednisone with patient in detail with patient verbal understanding. Patient fully understands and is agreeable with plan of care, at this point patient will follow up as needed unless acute concerns arise in the meantime.  Plan: CBC with Platelets, ipratropium - albuterol 0.5        mg/2.5 mg/3 mL (DUONEB) neb solution 3 mL,         predniSONE (DELTASONE) 20 MG tablet,         azithromycin (ZITHROMAX) 250 MG tablet, XR         Chest 2 Views, Basic metabolic panel  (Ca, Cl,         CO2, Creat, Gluc, K, Na, BUN)    (I50.33) Acute on chronic diastolic congestive heart failure (H)  (I48.0) Paroxysmal atrial fibrillation (H)  (R60.0) Pedal edema  Comment: Overall weight stable.  Suspect patient's symptoms could be pulmonary related.  However, would like to look into cardiac etiology.  Given patient's history of heart failure will get BNP today along with other labs as noted above.  Will follow-up on results and whether further direction is necessary.  If elevated will follow-up with cardiology and recommendations to further workup ischemia concerns. Patient fully understands and is  agreeable with plan of care, at this point patient will follow up as needed unless acute concerns arise in the meantime.  Plan: NT-proBNP    Follow-up    Follow-up Visit   Expected date:  May 27, 2025 (Approximate)      Follow Up Appointment Details:     Follow-up with whom?: My Department    Follow-Up for what?: Medicare Wellness    Welcome or Annual?: Annual Wellness    How?: In Person                 Carter Garcia is a 89 year old, presenting for the following health issues:  Respiratory Problems (Has a respiratory doctor but hard to get into see him), Foot Swelling (Both legs swelling. Started in December ), and blank (Blood oxygen levels are low-patient want to discuss this)        5/20/2025     9:31 AM   Additional Questions   Roomed by amalia acosta   Accompanied by wife danny     History of Present Illness       Reason for visit:  Respiatory problems   legs   He is taking medications regularly.        Concern - bilateral lower legs swelling and feet  Onset: december  Description: swelling  Intensity: mild  Progression of Symptoms:  same  Accompanying Signs & Symptoms: no  Previous history of similar problem: no  Precipitating factors:        Worsened by: eating salt  Alleviating factors:        Improved by: taking dieretic helps  Therapies tried and outcome: compression socks, elevating feet    -Over the past month patient and spouse have noted patient's activity tolerance has decreased, become more short of breath, and intermittently having wheezing.  - Recently seen by cardiology, ischemic testing/imaging discussed and was deferred given patient was asymptomatic  - Swelling seems to be stable, has improved over the last couple days; recently had Chinese food that caused a lot of swelling  - Patient also sees pulmonology, currently on Breztri twice daily and albuterol as needed  -Denies any fever, chills, myalgias    Review of Systems  Constitutional, HEENT, cardiovascular, pulmonary, gi and gu systems are  "negative, except as otherwise noted.      Objective    /52 (BP Location: Right arm, Patient Position: Sitting, Cuff Size: Adult Regular)   Pulse (!) 49   Resp 16   Ht 1.753 m (5' 9\")   Wt 76.2 kg (168 lb)   SpO2 (!) 90%   BMI 24.81 kg/m    Body mass index is 24.81 kg/m .  Physical Exam  Vitals and nursing note reviewed.   Constitutional:       General: He is not in acute distress.     Appearance: Normal appearance. He is not ill-appearing.   Cardiovascular:      Rate and Rhythm: Normal rate and regular rhythm.      Heart sounds: No murmur heard.     No friction rub. No gallop.   Pulmonary:      Effort: Accessory muscle usage present. No respiratory distress.      Breath sounds: No decreased breath sounds, wheezing, rhonchi or rales.   Skin:     General: Skin is warm and dry.   Neurological:      Mental Status: He is alert.   Psychiatric:         Mood and Affect: Mood normal.         Behavior: Behavior normal. Behavior is cooperative.         Thought Content: Thought content normal.         Judgment: Judgment normal.          Signed Electronically by: LIZZIE Gardner CNP    "

## 2025-05-20 NOTE — PROGRESS NOTES
The following nebulizer treatment was given:     MEDICATION: Duoneb  : [employer]   LOT #: [id number]  EXPIRATION DATE:  03/31/2026  NDC # 08741-829-41     Nebulizer Start Time:  10:45 AM  Nebulizer Stop Time:  10:52 AM  See Vital Signs Flowsheet    Slime Bonner CMA

## 2025-05-21 ENCOUNTER — RESULTS FOLLOW-UP (OUTPATIENT)
Dept: FAMILY MEDICINE | Facility: CLINIC | Age: OVER 89
End: 2025-05-21
Payer: COMMERCIAL

## 2025-05-21 LAB
ANION GAP SERPL CALCULATED.3IONS-SCNC: 11 MMOL/L (ref 7–15)
BUN SERPL-MCNC: 28.4 MG/DL (ref 8–23)
CALCIUM SERPL-MCNC: 9.9 MG/DL (ref 8.8–10.4)
CHLORIDE SERPL-SCNC: 104 MMOL/L (ref 98–107)
CREAT SERPL-MCNC: 1.33 MG/DL (ref 0.67–1.17)
EGFRCR SERPLBLD CKD-EPI 2021: 51 ML/MIN/1.73M2
GLUCOSE SERPL-MCNC: 72 MG/DL (ref 70–99)
HCO3 SERPL-SCNC: 28 MMOL/L (ref 22–29)
NT-PROBNP SERPL-MCNC: 2540 PG/ML (ref 0–852)
POTASSIUM SERPL-SCNC: 4.1 MMOL/L (ref 3.4–5.3)
SODIUM SERPL-SCNC: 143 MMOL/L (ref 135–145)

## 2025-05-27 NOTE — TELEPHONE ENCOUNTER
Attempt x 1 to call patient, no answer-VM was for [facility] so no message left (called twice).  Pt's wife called, Jana ([facility]) and no answer but VM left, please call clinic back at [phone] and ask to speak to a triage nurse.     Christi Atkins RN BSN  Clinic Nurse  [facility]

## 2025-05-28 NOTE — TELEPHONE ENCOUNTER
RN spoke to patient     Reviewed xray results note     He is feeling well and happy that the xray did not show any abnormalities     RN advised if symptoms return to call and report, patient agrees to this plan     Laura Miguel Registered Nurse  [facility]

## 2025-06-26 ENCOUNTER — OFFICE VISIT (OUTPATIENT)
Dept: FAMILY MEDICINE | Facility: CLINIC | Age: OVER 89
End: 2025-06-26
Payer: COMMERCIAL

## 2025-06-26 ENCOUNTER — TELEPHONE (OUTPATIENT)
Dept: FAMILY MEDICINE | Facility: CLINIC | Age: OVER 89
End: 2025-06-26

## 2025-06-26 VITALS
RESPIRATION RATE: 22 BRPM | OXYGEN SATURATION: 95 % | DIASTOLIC BLOOD PRESSURE: 62 MMHG | HEART RATE: 68 BPM | HEIGHT: 69 IN | WEIGHT: 166.8 LBS | TEMPERATURE: 97.5 F | BODY MASS INDEX: 24.71 KG/M2 | SYSTOLIC BLOOD PRESSURE: 132 MMHG

## 2025-06-26 DIAGNOSIS — I48.11 LONGSTANDING PERSISTENT ATRIAL FIBRILLATION (H): Primary | ICD-10-CM

## 2025-06-26 DIAGNOSIS — N18.31 STAGE 3A CHRONIC KIDNEY DISEASE (H): ICD-10-CM

## 2025-06-26 DIAGNOSIS — I50.20 HEART FAILURE WITH REDUCED EJECTION FRACTION, NYHA CLASS I (H): ICD-10-CM

## 2025-06-26 DIAGNOSIS — I63.9 CEREBROVASCULAR ACCIDENT (CVA), UNSPECIFIED MECHANISM (H): ICD-10-CM

## 2025-06-26 DIAGNOSIS — I63.40 CEREBROVASCULAR ACCIDENT (CVA) DUE TO EMBOLISM OF CEREBRAL ARTERY (H): ICD-10-CM

## 2025-06-26 DIAGNOSIS — J45.50 SEVERE PERSISTENT ASTHMA WITHOUT COMPLICATION (H): ICD-10-CM

## 2025-06-26 DIAGNOSIS — R60.0 BILATERAL LOWER EXTREMITY EDEMA: ICD-10-CM

## 2025-06-26 DIAGNOSIS — L57.0 AK (ACTINIC KERATOSIS): ICD-10-CM

## 2025-06-26 DIAGNOSIS — R79.9 ABNORMAL FINDING OF BLOOD CHEMISTRY, UNSPECIFIED: ICD-10-CM

## 2025-06-26 DIAGNOSIS — R31.21 ASYMPTOMATIC MICROSCOPIC HEMATURIA: Primary | ICD-10-CM

## 2025-06-26 PROBLEM — I42.9 CARDIOMYOPATHY (H): Status: RESOLVED | Noted: 2019-10-09 | Resolved: 2025-06-26

## 2025-06-26 PROBLEM — I77.9 RIGHT-SIDED CAROTID ARTERY DISEASE: Status: ACTIVE | Noted: 2022-01-07

## 2025-06-26 LAB
ALBUMIN UR-MCNC: NEGATIVE MG/DL
ALT SERPL W P-5'-P-CCNC: 27 U/L (ref 0–70)
APPEARANCE UR: CLEAR
BACTERIA #/AREA URNS HPF: ABNORMAL /HPF
BILIRUB UR QL STRIP: NEGATIVE
COLOR UR AUTO: YELLOW
CREAT UR-MCNC: 88.3 MG/DL
EST. AVERAGE GLUCOSE BLD GHB EST-MCNC: 117 MG/DL
GLUCOSE UR STRIP-MCNC: NEGATIVE MG/DL
HBA1C MFR BLD: 5.7 % (ref 0–5.6)
HGB UR QL STRIP: ABNORMAL
KETONES UR STRIP-MCNC: NEGATIVE MG/DL
LEUKOCYTE ESTERASE UR QL STRIP: NEGATIVE
MICROALBUMIN UR-MCNC: <12 MG/L
MICROALBUMIN/CREAT UR: NORMAL MG/G{CREAT}
NITRATE UR QL: NEGATIVE
PH UR STRIP: 7 [PH] (ref 5–7)
RBC #/AREA URNS AUTO: ABNORMAL /HPF
SP GR UR STRIP: 1.01 (ref 1–1.03)
UROBILINOGEN UR STRIP-ACNC: 1 E.U./DL
WBC #/AREA URNS AUTO: ABNORMAL /HPF

## 2025-06-26 RX ORDER — ATORVASTATIN CALCIUM 40 MG/1
40 TABLET, FILM COATED ORAL DAILY
Qty: 90 TABLET | Refills: 3 | Status: SHIPPED | OUTPATIENT
Start: 2025-06-26

## 2025-06-26 ASSESSMENT — ASTHMA QUESTIONNAIRES
ACT_TOTALSCORE: 17
QUESTION_2 LAST FOUR WEEKS HOW OFTEN HAVE YOU HAD SHORTNESS OF BREATH: ONCE A DAY
QUESTION_3 LAST FOUR WEEKS HOW OFTEN DID YOUR ASTHMA SYMPTOMS (WHEEZING, COUGHING, SHORTNESS OF BREATH, CHEST TIGHTNESS OR PAIN) WAKE YOU UP AT NIGHT OR EARLIER THAN USUAL IN THE MORNING: NOT AT ALL
QUESTION_1 LAST FOUR WEEKS HOW MUCH OF THE TIME DID YOUR ASTHMA KEEP YOU FROM GETTING AS MUCH DONE AT WORK, SCHOOL OR AT HOME: A LITTLE OF THE TIME
QUESTION_4 LAST FOUR WEEKS HOW OFTEN HAVE YOU USED YOUR RESCUE INHALER OR NEBULIZER MEDICATION (SUCH AS ALBUTEROL): ONE OR TWO TIMES PER DAY
QUESTION_5 LAST FOUR WEEKS HOW WOULD YOU RATE YOUR ASTHMA CONTROL: WELL CONTROLLED
ACT_TOTALSCORE: 17

## 2025-06-26 ASSESSMENT — PAIN SCALES - GENERAL: PAINLEVEL_OUTOF10: NO PAIN (0)

## 2025-06-26 NOTE — PROGRESS NOTES
Assessment & Plan     Longstanding persistent atrial fibrillation:  - Atrial fibrillation managed with apixaban and carvedilol.  - Continue apixaban 5 mg twice daily and carvedilol 6.25 mg twice daily.    Bilateral lower extremity edema:  - Edema likely related to congestive heart failure.  - Continue diuretic therapy, take both pills in the morning. Consider compression stockings and leg elevation.    Stage 3a chronic kidney disease:  - Slight increase in kidney markers, no serious concerns.  - Watchful monitoring, no treatment needed at this time.    Heart failure with reduced ejection fraction, NYHA class I:  - Heart failure with ejection fraction of 45%, no symptoms of shortness of breath.  - Continue current medications including lasix.    Severe persistent asthma without complication:  - Asthma well controlled with current inhaler regimen.  - Continue budesonide (Breztri) twice daily and emergency albuterol inhaler as needed. Follow-up with lung specialist in July.    Actinic keratosis (AK):  - Regular dermatology visits, recent removal of lesions.  - Continue regular dermatology visits.    Abnormal finding of blood chemistry, unspecified:  - Perform simple blood test to monitor.    Consent was obtained from the patient to use an AI documentation tool in the creation of this note.            Follow-up       Carter Garcia is a 89 year old, presenting for the following health issues:  Establish Care and Consult (Leg full of fluid )        6/26/2025     7:02 AM   Additional Questions   Roomed by amira goncalves   Accompanied by self     History of Present Illness       Reason for visit:  Visit   He is taking medications regularly.   - Jose Barraza, a 89-year-old male, reports feeling wonderful overall, with his blood pressure and weight down. He has been experiencing fluid retention in both legs for the last 6 months, which is currently managed with a diuretic. He had an episode in May with respiratory issues,  "but his lungs have been decent otherwise. He was hospitalized for four days in December during a trip to [facility] due to respiratory issues and believes the leg swelling started during this hospitalization. He has a history of atrial fibrillation and is taking apixaban 5 mg twice a day and carvedilol 6.25 mg twice a day. He experienced a stroke in 2019, with no current symptoms. He has a history of asthma, with increased symptoms in winter, and uses Breztri twice a day and an emergency inhaler as needed. He is a former smoker, having quit over 40 years ago after smoking 3 packs a day. There is a family history of asthma, as his father had asthma. He reports dryness in the mouth, which wakes him up at night. He engages in regular exercise, including working out 2-3 days a week and playing golf 3 days a week. He sees a dermatologist regularly and had some skin lesions removed about a month ago. He is up to date with COVID vaccinations, last received before a trip to [facility].      Concern - fluid in legs  Onset: 6 months ago  Description: big, full of fluid   Intensity: mild  Progression of Symptoms:  improving, pt states that the last 3 weeks his legs has gotten better but would still like to get seen for it and wanted to know how long he needed to take the  furosemide (LASIX) 20 MG tablet             Review of Systems  Constitutional, HEENT, cardiovascular, pulmonary, gi and gu systems are negative, except as otherwise noted.      Objective    /62 (BP Location: Right arm, Patient Position: Chair, Cuff Size: Adult Regular)   Pulse (!) 43   Temp 97.5  F (36.4  C) (Oral)   Resp 22   Ht 1.753 m (5' 9\")   Wt 75.7 kg (166 lb 12.8 oz)   SpO2 95%   BMI 24.63 kg/m    Body mass index is 24.63 kg/m .  Physical Exam   GENERAL: alert and no distress  NECK: no adenopathy, no asymmetry, masses, or scars  RESP: lungs clear to auscultation - no rales, rhonchi or wheezes  CV: regular rates and rhythm, normal S1 S2, " no S3 or S4, and no murmur, click or rub  ABDOMEN: soft, nontender, no hepatosplenomegaly, no masses and bowel sounds normal  MS: mild edema in both lower legs.            Signed Electronically by: Laron Pettit MD

## 2025-06-26 NOTE — TELEPHONE ENCOUNTER
Urine test is showing small amount of blood, can we repat UA doing clean catch please.  In 1 to 3 weeks.

## 2025-06-26 NOTE — TELEPHONE ENCOUNTER
Called patient.  Advised of result note.  Scheduled lab appt for 7/7/25 10 am.  Jessica Aragon RN

## 2025-06-26 NOTE — ASSESSMENT & PLAN NOTE
Paroxysmal atrial fibrillation, now in sinus rhythm   Continue on carvedilol 6.25 mg twice daily, and apixaban 5 mg twice daily.

## 2025-06-26 NOTE — ASSESSMENT & PLAN NOTE
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  Continue on Apixaban, and atorvastatin 40 mg daily.

## 2025-06-27 ENCOUNTER — RESULTS FOLLOW-UP (OUTPATIENT)
Dept: FAMILY MEDICINE | Facility: CLINIC | Age: OVER 89
End: 2025-06-27
Payer: COMMERCIAL

## 2025-06-27 DIAGNOSIS — N18.31 STAGE 3A CHRONIC KIDNEY DISEASE (H): Primary | ICD-10-CM

## 2025-06-27 NOTE — RESULT ENCOUNTER NOTE
Called patient, left voicemail for call back to any triage nurse.     Ainsley Bautista RN on 6/27/2025 at 1:51 PM

## 2025-06-27 NOTE — TELEPHONE ENCOUNTER
Labs are all normal, slightly high blood sugar, but it's something we just have to monitor yearly.  His urine is showing small amount of blood, can we repeat it but make sure it is clean catch (ordered)  Anytime in the next couple of weeks.

## 2025-06-30 NOTE — TELEPHONE ENCOUNTER
Received a call from pt,     Relayed provider message below.     Pt verbalizes understanding and is agreeable with plan. Pt denies any further questions or concerns at this time.     Moon GAVIRIA, RN   Clinic RN  [facility]

## 2025-07-07 ENCOUNTER — LAB (OUTPATIENT)
Dept: LAB | Facility: CLINIC | Age: OVER 89
End: 2025-07-07
Payer: COMMERCIAL

## 2025-07-07 DIAGNOSIS — N18.31 STAGE 3A CHRONIC KIDNEY DISEASE (H): ICD-10-CM

## 2025-07-07 DIAGNOSIS — R31.21 ASYMPTOMATIC MICROSCOPIC HEMATURIA: ICD-10-CM

## 2025-07-07 LAB
ALBUMIN UR-MCNC: NEGATIVE MG/DL
APPEARANCE UR: CLEAR
BILIRUB UR QL STRIP: NEGATIVE
COLOR UR AUTO: YELLOW
GLUCOSE UR STRIP-MCNC: NEGATIVE MG/DL
HGB UR QL STRIP: NEGATIVE
KETONES UR STRIP-MCNC: NEGATIVE MG/DL
LEUKOCYTE ESTERASE UR QL STRIP: NEGATIVE
NITRATE UR QL: NEGATIVE
PH UR STRIP: 5.5 [PH] (ref 5–7)
SP GR UR STRIP: 1.01 (ref 1–1.03)
UROBILINOGEN UR STRIP-ACNC: 0.2 E.U./DL

## 2025-07-07 PROCEDURE — 81003 URINALYSIS AUTO W/O SCOPE: CPT
